# Patient Record
Sex: FEMALE | Race: WHITE | Employment: FULL TIME | URBAN - METROPOLITAN AREA
[De-identification: names, ages, dates, MRNs, and addresses within clinical notes are randomized per-mention and may not be internally consistent; named-entity substitution may affect disease eponyms.]

---

## 2017-02-07 ENCOUNTER — MYC MEDICAL ADVICE (OUTPATIENT)
Dept: INTERNAL MEDICINE | Facility: CLINIC | Age: 52
End: 2017-02-07

## 2017-02-07 DIAGNOSIS — R00.2 PALPITATIONS: Primary | ICD-10-CM

## 2017-02-08 NOTE — TELEPHONE ENCOUNTER
See my chart message  See my response.    Pt is wondering about a heart even monitor.  Please advise  Harshal MANJARREZ RN

## 2017-02-21 ENCOUNTER — MYC MEDICAL ADVICE (OUTPATIENT)
Dept: INTERNAL MEDICINE | Facility: CLINIC | Age: 52
End: 2017-02-21

## 2017-02-21 ENCOUNTER — HOSPITAL ENCOUNTER (OUTPATIENT)
Dept: CARDIOLOGY | Facility: CLINIC | Age: 52
Discharge: HOME | End: 2017-02-21
Attending: INTERNAL MEDICINE | Admitting: INTERNAL MEDICINE
Payer: COMMERCIAL

## 2017-02-21 DIAGNOSIS — R00.2 PALPITATIONS: ICD-10-CM

## 2017-02-21 PROCEDURE — 93272 ECG/REVIEW INTERPRET ONLY: CPT | Performed by: INTERNAL MEDICINE

## 2017-02-21 PROCEDURE — 93270 REMOTE 30 DAY ECG REV/REPORT: CPT

## 2017-03-02 ENCOUNTER — TELEPHONE (OUTPATIENT)
Dept: INTERNAL MEDICINE | Facility: CLINIC | Age: 52
End: 2017-03-02

## 2017-03-02 DIAGNOSIS — I47.10 PAROXYSMAL SUPRAVENTRICULAR TACHYCARDIA (H): Primary | ICD-10-CM

## 2017-03-02 NOTE — TELEPHONE ENCOUNTER
Sounds like she needs to see Cardiology. I don't think she has an outside Cardiologist so referral placed. Please call her and let her know.

## 2017-03-02 NOTE — TELEPHONE ENCOUNTER
Doug from [employer] (heart monitor company) calling to report:    Abnormal ekg reading last night @ 7:41pm:  Super ventricular tach--highest heart rate was 192.    Will fax over report.

## 2017-03-28 ENCOUNTER — CARE COORDINATION (OUTPATIENT)
Dept: CARDIOLOGY | Facility: CLINIC | Age: 52
End: 2017-03-28

## 2017-03-28 NOTE — PROGRESS NOTES
Call to pts cell number; left vm    We can move pts appt to 4/4 at 1:00 pm with Dr Boyd or   4/10 at 1 or 2 (if still open) scheduled. Asked to call back.     Routed msg to pt via my chart.

## 2017-04-02 ASSESSMENT — ENCOUNTER SYMPTOMS
BACK PAIN: 0
NECK PAIN: 0
STIFFNESS: 1
JOINT SWELLING: 0
EXERCISE INTOLERANCE: 0
MYALGIAS: 0
SLEEP DISTURBANCES DUE TO BREATHING: 0
CLAUDICATION: 0
LEG SWELLING: 0
ARTHRALGIAS: 1
SYNCOPE: 0
MUSCLE WEAKNESS: 0
HYPOTENSION: 0
ORTHOPNEA: 0
PALPITATIONS: 1
HYPERTENSION: 0
TACHYCARDIA: 1
LIGHT-HEADEDNESS: 0
MUSCLE CRAMPS: 0
LEG PAIN: 1

## 2017-04-05 ENCOUNTER — TRANSFERRED RECORDS (OUTPATIENT)
Dept: HEALTH INFORMATION MANAGEMENT | Facility: CLINIC | Age: 52
End: 2017-04-05

## 2017-04-10 ENCOUNTER — OFFICE VISIT (OUTPATIENT)
Dept: CARDIOLOGY | Facility: CLINIC | Age: 52
End: 2017-04-10
Attending: INTERNAL MEDICINE
Payer: COMMERCIAL

## 2017-04-10 ENCOUNTER — PRE VISIT (OUTPATIENT)
Dept: CARDIOLOGY | Facility: CLINIC | Age: 52
End: 2017-04-10

## 2017-04-10 VITALS
HEIGHT: 68 IN | OXYGEN SATURATION: 100 % | WEIGHT: 180 LBS | HEART RATE: 65 BPM | DIASTOLIC BLOOD PRESSURE: 88 MMHG | SYSTOLIC BLOOD PRESSURE: 143 MMHG | BODY MASS INDEX: 27.28 KG/M2

## 2017-04-10 DIAGNOSIS — I47.10 PAROXYSMAL SUPRAVENTRICULAR TACHYCARDIA (H): Primary | ICD-10-CM

## 2017-04-10 DIAGNOSIS — I47.10 PAROXYSMAL SUPRAVENTRICULAR TACHYCARDIA (H): ICD-10-CM

## 2017-04-10 PROCEDURE — 99204 OFFICE O/P NEW MOD 45 MIN: CPT | Mod: GC | Performed by: INTERNAL MEDICINE

## 2017-04-10 PROCEDURE — 99212 OFFICE O/P EST SF 10 MIN: CPT | Mod: ZF

## 2017-04-10 PROCEDURE — 93010 ELECTROCARDIOGRAM REPORT: CPT | Mod: ZP | Performed by: INTERNAL MEDICINE

## 2017-04-10 PROCEDURE — 93005 ELECTROCARDIOGRAM TRACING: CPT | Mod: ZF

## 2017-04-10 RX ORDER — VERAPAMIL HYDROCHLORIDE 80 MG/1
80 TABLET ORAL 3 TIMES DAILY PRN
Qty: 5 TABLET | Refills: 3 | Status: SHIPPED | OUTPATIENT
Start: 2017-04-10 | End: 2023-01-31

## 2017-04-10 ASSESSMENT — PAIN SCALES - GENERAL: PAINLEVEL: NO PAIN (0)

## 2017-04-10 NOTE — PATIENT INSTRUCTIONS
Cardiology Provider you saw in clinic today:  Dr. Aryan Boyd MD    Medication Changes:  Verapamil as instructed by Dr Boyd.     Order is in place, you can call and scheduled echocardiogram at [facility].   [address] [facility] [place] [address]   [phone]       Labs/Tests needed:     Follow-up Visit:  prn    Further Instructions:      You will receive all normal lab and testing results via MyChart or mail if not reviewed in clinic today. Please contact our office if you need assistance with setting up MyChart.    If you need a medication refill please contact your pharmacy. Please allow 3 business days for your refill to be completed.     As always, thank you for trusting us with your health care needs!    If you have any questions regarding your visit please contact your care team:   Cardiology  Telephone Number    EP RN  Electrophysiology Nurse Coordinator [phone]     Call for EP procedure scheduling concerns  Lorena Wilkins,   ELIO  [phone]           Device Clinic (Pacemakers, ICDs, Loop)   RN's : Diana Hall Connie, Dawn During business hours: [phone]    After business hours:   [phone]- select option 4 and ask for job code [id number].

## 2017-04-10 NOTE — TELEPHONE ENCOUNTER
Clinical Cardiac Electrophysiology    Chief Complaint:  paroxysmal SVT     HPI: Marisela Beaver is a 51 year old female who presents for follow up of SVT.   Her past medical history includes hyperlipidemia, PTSD, benign neoplasm of the cerebral meninges, thyrotoxicosis, latrogenic PE and infarction, embolism, thrombosis.      Per Dr. Cabral's 12/26/2016 note, she states she is having daily symptoms of paroxysmal SVT symptoms, cardionet was placed.      Current Outpatient Prescriptions   Medication Sig Dispense Refill     carBAMazepine (CARBATROL) 100 MG 12 hr capsule Take 3 caps (300mg) by mouth in AM and 4 caps (400mg) in  capsule 11     IBUPROFEN PO Take  by mouth as needed.         Past Medical History:   Diagnosis Date     Benign neoplasm of cerebral meninges (H)      Embolism and thrombosis of unspecified site 9/07    complication from meningioma removal     Iatrogenic pulmonary embolism and infarction (H) 9/07    complication from meningioma removal     Paroxysmal supraventricular tachycardia (H)      Thyrotoxicosis without mention of goiter or other cause, without mention of thyrotoxic crisis or storm     txd .w propylthyouarcil twice currently in remission       Past Surgical History:   Procedure Laterality Date     C EXCIS SUPRATENT MENINGIOMA  9/07    right frontal 8cm       Family History   Problem Relation Age of Onset     DIABETES Father      Hypertension Mother      HEART DISEASE Father      CANCER Mother      breast cancer     Thyroid Disease Father      graves disease       Social History   Substance Use Topics     Smoking status: Never Smoker     Smokeless tobacco: Never Used     Alcohol use 0.0 oz/week     0 Standard drinks or equivalent per week      Comment: occ       No Known Allergies      ROS:   CONSTITUTIONAL:No report of fever, chills,  or change in weight  RESPIRATORY: No cough, wheezing, SOB, or hemoptysis  CARDIOVASCULAR: see HPI  MUSCULO-SKELETAL: No joint pain/swelling, no  muslce pain  NEURO: No paresthesias, syncope, pre-syncope, light headness, dizziness or vertigo  ENDOCRINE: No temperature intolerance, no skin/hair changes  PSYCHIATRIC: No change in mood, feeling down/anxious, no change in sleep or appetite  GI: no melena or hematochezia, no change in bowel habits  : no hematuria or dysurea, no hesitancy, dribbling or incontinence  HEME: no easy bruising or bleeding, no history of anemia, no history of blood clots  SKIN: no rashes or sores, no unusual itching        Physical Examination:  Vitals: There were no vitals taken for this visit.  BMI= There is no height or weight on file to calculate BMI.    GENERAL APPEARANCE: healthy, alert and no distress  HEENT: no icterus, no xanthelasmas, normal pupil size and reaction, normal palate, mucosa moist, no cyanosis.  NECK: no adenopathy, no asymmetry, masses, or scars, thyroid normal to palpation, carotid upstrokes are normal bilaterally, no cervical bruits, JVP is not visible  CHEST: lungs clear to auscultation - no rales, rhonchi or wheezes, no use of accessory muscles, no retractions, respirations are unlabored, normal respiratory rate, no kyphosis, no scoliosis  CARDIOVASCULAR: regular rhythm, normal S1 with physiologic split S2, no S3 or S4 and no murmur, click or rub, precordium quiet with normal PMI.  ABDOMEN: soft, non tender, without hepatosplenomegaly, no masses palpable, bowel sounds normal, aorta not enlarged by palpation, no abdominal bruits  EXTREMITIES: no clubbing, cyanosis or edema  NEURO: alert and oriented to person/place/time, normal speech, gait and affect  VASC: Radial, femoral, dorsalis pedis and posterior tibialis pulses are normal in volumes and symmetric bilaterally. No vascular bruits heard.  SKIN: no ecchymoses, no rashes      Laboratory:    Component      Latest Ref Rng & Units 12/26/2016   Sodium      133 - 144 mmol/L 142   Potassium      3.4 - 5.3 mmol/L 4.2   Chloride      94 - 109 mmol/L 107   Carbon  Dioxide      20 - 32 mmol/L 29   Anion Gap      3 - 14 mmol/L 6   Glucose      70 - 99 mg/dL 109 (H)   Urea Nitrogen      7 - 30 mg/dL 13   Creatinine      0.52 - 1.04 mg/dL 0.84   GFR Estimate      >60 mL/min/1.7m2 71   GFR Estimate If Black      >60 mL/min/1.7m2 86   Calcium      8.5 - 10.1 mg/dL 8.7   Bilirubin Total      0.2 - 1.3 mg/dL 0.3   Albumin      3.4 - 5.0 g/dL 3.8   Protein Total      6.8 - 8.8 g/dL 7.5   Alkaline Phosphatase      40 - 150 U/L 95   ALT      0 - 50 U/L 29   AST      0 - 45 U/L 16   WBC      4.0 - 11.0 10e9/L 4.9   RBC Count      3.8 - 5.2 10e12/L 4.19   Hemoglobin      11.7 - 15.7 g/dL 13.8   Hematocrit      35.0 - 47.0 % 40.9   MCV      78 - 100 fl 98   MCH      26.5 - 33.0 pg 32.9   MCHC      31.5 - 36.5 g/dL 33.7   RDW      10.0 - 15.0 % 12.1   Platelet Count      150 - 450 10e9/L 173   TSH      0.40 - 4.00 mU/L 1.36       CARDIONET EVENT MONITOR -   2/21-3/21/2017    Assessment and recommendations:    I appreciate the chance to help with Ms. Sd barrientos. Please let me know if you have any questions or concerns.

## 2017-04-10 NOTE — PROGRESS NOTES
"      Clinical Cardiac Electrophysiology    Chief Complaint:  paroxysmal SVT     HPI: Marisela Beaver is a 51 year old female who presents for follow up of SVT.   Her past medical history includes hyperlipidemia, PTSD, benign neoplasm of the cerebral meninges, thyrotoxicosis (currently in remission), latrogenic PE and infarction, embolism, thrombosis.      Previously evaluated by Dr. Geremias Miller at [facility] on August 31, 2001 for palpitations, discussed options including ablation and beta blockers which is not a fan off due to slow metabolism. Since then, palpitations gradually becoming more frequent and slightly more bothersome. She mentioned to her primary care provider in Dec 2016 about the increasing frequency, and 30-day Cardionet was requested. During that monitor she had 2 \"bad\" episodes which last >1 hr with a heart rate >190 bpm. She feels slightly lightheaded and uncomfortable during the episodes, but no shortness of breath, chest pain, syncope or presyncope. She can generally continue whatever she is doing during the palpitations. Has never had to seek evaluation in the ED for this. Tries vagal maneuvers with deep breathing or coughing, but they don't typically work. Has not tried cold water. She worries that the episodes of palpitations might cause damage to her heart or lead to a heart attack.     Of note, she has two adult children (twins, age 18). No cardiac complications during pregnancy. Has history of benign brain tumor s/p resection, with provoked DVT/PE thereafter. IVC filter for 30 days, now removed. No DVT/PE since then. Was briefly on BBlocker medication around the time of the brain surgery, but didn't like the way it made her feel, and worries about the possible side effect of \"slowed metabolism\". She is generally a very active person and would prefer to avoid medications. She things that's that extremely stressful situations may sometimes be a trigger for SVT.       Current Outpatient " Prescriptions   Medication Sig Dispense Refill     carBAMazepine (CARBATROL) 100 MG 12 hr capsule Take 3 caps (300mg) by mouth in AM and 4 caps (400mg) in  capsule 11     IBUPROFEN PO Take  by mouth as needed.         Past Medical History:   Diagnosis Date     Benign neoplasm of cerebral meninges (H)      Embolism and thrombosis of unspecified site 9/07    complication from meningioma removal     Iatrogenic pulmonary embolism and infarction (H) 9/07    complication from meningioma removal     Paroxysmal supraventricular tachycardia (H)      Thyrotoxicosis without mention of goiter or other cause, without mention of thyrotoxic crisis or storm     txd .w propylthyouarcil twice currently in remission       Past Surgical History:   Procedure Laterality Date     C EXCIS SUPRATENT MENINGIOMA  9/07    right frontal 8cm       Family History   Problem Relation Age of Onset     DIABETES Father      Hypertension Mother      HEART DISEASE Father      CANCER Mother      breast cancer     Thyroid Disease Father      graves disease       Social History   Substance Use Topics     Smoking status: Never Smoker     Smokeless tobacco: Never Used     Alcohol use 0.0 oz/week     0 Standard drinks or equivalent per week      Comment: occ   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 Lifelong nonsmoker. Rare ETOH use. No illicit drug use.     Allergies: No Known Allergies    ROS:   CONSTITUTIONAL:No report of fever, chills,  or change in weight  RESPIRATORY: No cough, wheezing, SOB, or hemoptysis  CARDIOVASCULAR: see HPI  MUSCULO-SKELETAL: No joint pain/swelling, no muscle pain  NEURO: No paresthesias, syncope, pre-syncope, light headness, dizziness or vertigo  ENDOCRINE: No temperature intolerance, no skin/hair changes  PSYCHIATRIC: No change in mood, feeling down/anxious, no change in sleep or appetite  GI: no melena or hematochezia, no change in bowel habits  : no hematuria or dysurea, no hesitancy, dribbling or  "incontinence  HEME: no easy bruising or bleeding, no history of anemia, no history of blood clots  SKIN: no rashes or sores, no unusual itching    Answers for HPI/ROS submitted by the patient on 4/2/2017   General Symptoms: No  Skin Symptoms: No  HENT Symptoms: No  EYE SYMPTOMS: No  HEART SYMPTOMS: Yes  LUNG SYMPTOMS: No  INTESTINAL SYMPTOMS: No  URINARY SYMPTOMS: No  GYNECOLOGIC SYMPTOMS: No  BREAST SYMPTOMS: No  SKELETAL SYMPTOMS: Yes  BLOOD SYMPTOMS: No  NERVOUS SYSTEM SYMPTOMS: No  MENTAL HEALTH SYMPTOMS: No  Chest pain or pressure: No  Fast or irregular heartbeat: Yes  Pain in legs with walking: Yes  Swelling in feet or ankles: No  Trouble breathing while lying down: No  Fingers or Toes appear blue: No  High blood pressure: No  Low blood pressure: No  Fainting: No  Murmurs: No  Chest pain on exertion: No  Chest pain at rest: No  Cramping pain in leg during exercise: No  Pacemaker: No  Varicose veins: No  Edema or swelling: No  Fast heart beat: Yes  Wake up at night with shortness of breath: No  Heart flutters: Yes  Light-headedness: No  Exercise intolerance: No  Back pain: No  Muscle aches: No  Neck pain: No  Swollen joints: No  Joint pain: Yes  Bone pain: No  Muscle cramps: No  Muscle weakness: No  Joint stiffness: Yes  Bone fracture: No      Physical Examination:  Vitals: /88  Pulse 65  Ht 1.727 m (5' 8\")  Wt 81.6 kg (180 lb)  SpO2 100%  BMI 27.37 kg/m2  BMI= Body mass index is 27.37 kg/(m^2).  GENERAL APPEARANCE: healthy, alert and no distress  HEENT: no icterus, no xanthelasmas, normal pupil size and reaction, normal palate, mucosa moist, no cyanosis.  NECK: no adenopathy, no asymmetry, masses, or scars, thyroid normal to palpation, carotid upstrokes are normal bilaterally, no cervical bruits, JVP is not visible  CHEST: lungs clear to auscultation - no rales, rhonchi or wheezes, no use of accessory muscles, no retractions, respirations are unlabored, normal respiratory rate, no kyphosis, no " scoliosis  CARDIOVASCULAR: regular rhythm, normal S1 with physiologic split S2, no S3 or S4 and no murmur, click or rub, precordium quiet with normal PMI.  ABDOMEN: soft, non tender, no masses palpable, bowel sounds normal, no abdominal bruits  EXTREMITIES: no clubbing, cyanosis or edema  NEURO: alert and oriented to person/place/time, normal speech, gait and affect  VASC: Radial and posterior tibialis pulses are normal in volumes and symmetric bilaterally. No vascular bruits heard.  SKIN: no ecchymoses, no rashes      Laboratory:    Component      Latest Ref Rng & Units 12/26/2016   Sodium      133 - 144 mmol/L 142   Potassium      3.4 - 5.3 mmol/L 4.2   Chloride      94 - 109 mmol/L 107   Carbon Dioxide      20 - 32 mmol/L 29   Anion Gap      3 - 14 mmol/L 6   Glucose      70 - 99 mg/dL 109 (H)   Urea Nitrogen      7 - 30 mg/dL 13   Creatinine      0.52 - 1.04 mg/dL 0.84   GFR Estimate      >60 mL/min/1.7m2 71   GFR Estimate If Black      >60 mL/min/1.7m2 86   Calcium      8.5 - 10.1 mg/dL 8.7   Bilirubin Total      0.2 - 1.3 mg/dL 0.3   Albumin      3.4 - 5.0 g/dL 3.8   Protein Total      6.8 - 8.8 g/dL 7.5   Alkaline Phosphatase      40 - 150 U/L 95   ALT      0 - 50 U/L 29   AST      0 - 45 U/L 16   WBC      4.0 - 11.0 10e9/L 4.9   RBC Count      3.8 - 5.2 10e12/L 4.19   Hemoglobin      11.7 - 15.7 g/dL 13.8   Hematocrit      35.0 - 47.0 % 40.9   MCV      78 - 100 fl 98   MCH      26.5 - 33.0 pg 32.9   MCHC      31.5 - 36.5 g/dL 33.7   RDW      10.0 - 15.0 % 12.1   Platelet Count      150 - 450 10e9/L 173   TSH      0.40 - 4.00 mU/L 1.36       CARDIONET EVENT MONITOR - 2/21-3/21/2017:      EKG 4/10/17:  Sinus rhythm, rate 57, normal axis, normal intervals, RSr' pattern in V1-V2 with corresponding T-wave inversion, no ischemic changes or evidence of prior infarction. No delta wave. During a subsequent rhythm strip, there is evidence of sinus slowing with accelerated junctional rhythm.      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 Beaver is only modestly symptomatic, but with increasing frequency and duration of episodes she may consider a treatment option in the future. No associated stroke risk with SVT.   We educated about additional vagal maneuvers which could be helpful to break an episode of SVT.  Options for treatment including medications (less successful) and EPS +/- ablation. The procedural risk of EP study and ablation were discussed in detail. Risks discussed include: vascular complications, CVA, AVB, pericardial effusion and tamponade, esophageal fistula, PV stenosis. She might be at potentially slightly higher risk for DVT/PE (could consider short-term plan for anticoagulation ria-operatively). She would like to think about it some more.     Medications are not perfectly treat this arhythmia but are not 100% effective.    - Beta blockers (she would prefer to avoid)  - Calcium channel blockers (i.e. Verapamil PRN, she is willing to try this).     Recs:  --Will Rx Verapamil 80mg short-acting tab PRN for prolonged episode of tachycardia  --Instructed to lie down and rest after taking Verapamil due to risk of blood pressure lowering  --She will consider EP study +/- ablation and let us know if she'd like to proceed  --RTC as needed    I appreciate the chance to help with Ms. Beaver care. Please let me know if you have any questions or concerns.    Patient seen and discussed with Dr. Boyd in clinic.    Cha Schuster MD  Cardiology Fellow  [phone]    Attending: Patient seen and examined with Dr. Schuster (Cardiology Fellow). The H&P is accurate as recorded and includes any additional findings of mine. All labs and imaging studies reviewed personally. The assessment and plans outlined reflect our joint decision making.    Aryan Boyd MD

## 2017-04-10 NOTE — MR AVS SNAPSHOT
After Visit Summary   4/10/2017    Marisela Beaver    MRN: [id number]           Patient Information     Date Of Birth          1965        Visit Information        Provider Department      4/10/2017 1:00 PM Aryan Boyd MD [facility]        Today's Diagnoses     Paroxysmal supraventricular tachycardia (H)          Care Instructions    Cardiology Provider you saw in clinic today:  Dr. Aryan Boyd MD    Medication Changes:  Verapamil as instructed by Dr Boyd.     Order is in place, you can call and scheduled echocardiogram at [facility].   [address] [facility] [place] [address]   [phone]       Labs/Tests needed:     Follow-up Visit:  prn    Further Instructions:      You will receive all normal lab and testing results via [employer] or mail if not reviewed in clinic today. Please contact our office if you need assistance with setting up MyChart.    If you need a medication refill please contact your pharmacy. Please allow 3 business days for your refill to be completed.     As always, thank you for trusting us with your health care needs!    If you have any questions regarding your visit please contact your care team:   Cardiology  Telephone Number    EP RN  Electrophysiology Nurse Coordinator [phone]     Call for EP procedure scheduling concerns  ELIO Adam  [phone]           Device Clinic (Pacemakers, ICDs, Loop)   RN's : Diana Hall Connie, Dawn During business hours: [phone]    After business hours:   [phone]- select option 4 and ask for job code [id number].              Follow-ups after your visit        Follow-up notes from your care team     Return for SVT follow up.      Your next 10 appointments already scheduled     Apr 10, 2017  3:00 PM CDT   Ech Complete with [id number]   [facility] Echo ([facility])    [address]  [address] [facility] [place] [address] [phone]           1.  Please  "bring or wear a comfortable two-piece outfit. 2.  You may eat, drink and take your normal medicines. 3.  For any questions that cannot be answered, please contact the ordering physician              Future tests that were ordered for you today     Open Future Orders        Priority Expected Expires Ordered    Echocardiogram Complete Routine  4/10/2018 4/10/2017            Who to contact     If you have questions or need follow up information about today's clinic visit or your schedule please contact [facility] directly at [phone].  Normal or non-critical lab and imaging results will be communicated to you by [employer], letter or phone within 4 business days after the clinic has received the results. If you do not hear from us within 7 days, please contact the clinic through [employer] or phone. If you have a critical or abnormal lab result, we will notify you by phone as soon as possible.  Submit refill requests through [employer] or call your pharmacy and they will forward the refill request to us. Please allow 3 business days for your refill to be completed.          Additional Information About Your Visit        [employer] Information     [employer] gives you secure access to your electronic health record. If you see a primary care provider, you can also send messages to your care team and make appointments. If you have questions, please call your primary care clinic.  If you do not have a primary care provider, please call [phone] and they will assist you.        Care EveryWhere ID     This is your Care EveryWhere ID. This could be used by other organizations to access your [place] medical records  [id number]        Your Vitals Were     Pulse Height Pulse Oximetry BMI (Body Mass Index)          65 1.727 m (5' 8\") 100% 27.37 kg/m2         Blood Pressure from Last 3 Encounters:   04/10/17 143/88   12/26/16 128/82   08/12/16 142/90    Weight from Last 3 Encounters:   04/10/17 81.6 kg (180 lb)   12/26/16 " 83.5 kg (184 lb)   08/12/16 81.6 kg (180 lb)              We Performed the Following     EKG 12-lead, tracing only (Future)          Today's Medication Changes          These changes are accurate as of: 4/10/17  2:16 PM.  If you have any questions, ask your nurse or doctor.               Start taking these medicines.        Dose/Directions    verapamil 80 MG tablet   Commonly known as:  CALAN   Used for:  Paroxysmal supraventricular tachycardia (H)   Started by:  Aryan Boyd MD        Dose:  80 mg   Take 1 tablet (80 mg) by mouth 3 times daily as needed   Quantity:  5 tablet   Refills:  3            Where to get your medicines      These medications were sent to [facility] [facility] [address] [facility] - [place], [place] - [address]  [address] [place] [address]     Phone:  [phone]     verapamil 80 MG tablet                Primary Care Provider Office Phone # Fax #    Madison Cabral [phone]       [facility] [address]   [facility] [address]        Thank you!     Thank you for choosing [facility]  for your care. Our goal is always to provide you with excellent care. Hearing back from our patients is one way we can continue to improve our services. Please take a few minutes to complete the written survey that you may receive in the mail after your visit with us. Thank you!             Your Updated Medication List - Protect others around you: Learn how to safely use, store and throw away your medicines at www.disposemymeds.org.          This list is accurate as of: 4/10/17  2:16 PM.  Always use your most recent med list.                   Brand Name Dispense Instructions for use    carBAMazepine 100 MG 12 hr capsule    CARBATROL    210 capsule    Take 3 caps (300mg) by mouth in AM and 4 caps (400mg) in PM       IBUPROFEN PO      Take  by mouth as needed.       verapamil 80 MG tablet    CALAN    5 tablet    Take 1 tablet (80 mg) by mouth 3 times daily  as needed

## 2017-04-10 NOTE — NURSING NOTE
Chief Complaint   Patient presents with     New Patient     EKG-SVT- palpitations, SVT noted on 30 DEM, ref by PCP- noted SVT in 2007- holter, short run, normal echo 2007

## 2017-04-10 NOTE — LETTER
"4/10/2017      RE: Marisela Beaver  [address] [place] [address]       Dear Colleague,    Thank you for the opportunity to participate in the care of your patient, Marisela Beaver, at the [facility]. Please see a copy of my visit note below.          Clinical Cardiac Electrophysiology    Chief Complaint:  paroxysmal SVT     HPI: Marisela Beaver is a 51 year old female who presents for follow up of SVT.   Her past medical history includes hyperlipidemia, PTSD, benign neoplasm of the cerebral meninges, thyrotoxicosis (currently in remission), latrogenic PE and infarction, embolism, thrombosis.      Previously evaluated by Dr. Geremias Miller at [facility] on August 31, 2001 for palpitations, discussed options including ablation and beta blockers which is not a fan off due to slow metabolism. Since then, palpitations gradually becoming more frequent and slightly more bothersome. She mentioned to her primary care provider in Dec 2016 about the increasing frequency, and 30-day Cardionet was requested. During that monitor she had 2 \"bad\" episodes which last >1 hr with a heart rate >190 bpm. She feels slightly lightheaded and uncomfortable during the episodes, but no shortness of breath, chest pain, syncope or presyncope. She can generally continue whatever she is doing during the palpitations. Has never had to seek evaluation in the ED for this. Tries vagal maneuvers with deep breathing or coughing, but they don't typically work. Has not tried cold water. She worries that the episodes of palpitations might cause damage to her heart or lead to a heart attack.     Of note, she has two adult children (twins, age 18). No cardiac complications during pregnancy. Has history of benign brain tumor s/p resection, with provoked DVT/PE thereafter. IVC filter for 30 days, now removed. No DVT/PE since then. Was briefly on BBlocker medication around the " "time of the brain surgery, but didn't like the way it made her feel, and worries about the possible side effect of \"slowed metabolism\". She is generally a very active person and would prefer to avoid medications. She things that's that extremely stressful situations may sometimes be a trigger for SVT.       Current Outpatient Prescriptions   Medication Sig Dispense Refill     carBAMazepine (CARBATROL) 100 MG 12 hr capsule Take 3 caps (300mg) by mouth in AM and 4 caps (400mg) in  capsule 11     IBUPROFEN PO Take  by mouth as needed.         Past Medical History:   Diagnosis Date     Benign neoplasm of cerebral meninges (H)      Embolism and thrombosis of unspecified site 9/07    complication from meningioma removal     Iatrogenic pulmonary embolism and infarction (H) 9/07    complication from meningioma removal     Paroxysmal supraventricular tachycardia (H)      Thyrotoxicosis without mention of goiter or other cause, without mention of thyrotoxic crisis or storm     txd .w propylthyouarcil twice currently in remission       Past Surgical History:   Procedure Laterality Date     C EXCIS SUPRATENT MENINGIOMA  9/07    right frontal 8cm       Family History   Problem Relation Age of Onset     DIABETES Father      Hypertension Mother      HEART DISEASE Father      CANCER Mother      breast cancer     Thyroid Disease Father      graves disease       Social History   Substance Use Topics     Smoking status: Never Smoker     Smokeless tobacco: Never Used     Alcohol use 0.0 oz/week     0 Standard drinks or equivalent per week      Comment: occ   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 Lifelong nonsmoker. Rare ETOH use. No illicit drug use.     Allergies: No Known Allergies    ROS:   CONSTITUTIONAL:No report of fever, chills,  or change in weight  RESPIRATORY: No cough, wheezing, SOB, or hemoptysis  CARDIOVASCULAR: see HPI  MUSCULO-SKELETAL: No joint pain/swelling, no muscle pain  NEURO: No paresthesias, " "syncope, pre-syncope, light headness, dizziness or vertigo  ENDOCRINE: No temperature intolerance, no skin/hair changes  PSYCHIATRIC: No change in mood, feeling down/anxious, no change in sleep or appetite  GI: no melena or hematochezia, no change in bowel habits  : no hematuria or dysurea, no hesitancy, dribbling or incontinence  HEME: no easy bruising or bleeding, no history of anemia, no history of blood clots  SKIN: no rashes or sores, no unusual itching    Answers for HPI/ROS submitted by the patient on 4/2/2017   General Symptoms: No  Skin Symptoms: No  HENT Symptoms: No  EYE SYMPTOMS: No  HEART SYMPTOMS: Yes  LUNG SYMPTOMS: No  INTESTINAL SYMPTOMS: No  URINARY SYMPTOMS: No  GYNECOLOGIC SYMPTOMS: No  BREAST SYMPTOMS: No  SKELETAL SYMPTOMS: Yes  BLOOD SYMPTOMS: No  NERVOUS SYSTEM SYMPTOMS: No  MENTAL HEALTH SYMPTOMS: No  Chest pain or pressure: No  Fast or irregular heartbeat: Yes  Pain in legs with walking: Yes  Swelling in feet or ankles: No  Trouble breathing while lying down: No  Fingers or Toes appear blue: No  High blood pressure: No  Low blood pressure: No  Fainting: No  Murmurs: No  Chest pain on exertion: No  Chest pain at rest: No  Cramping pain in leg during exercise: No  Pacemaker: No  Varicose veins: No  Edema or swelling: No  Fast heart beat: Yes  Wake up at night with shortness of breath: No  Heart flutters: Yes  Light-headedness: No  Exercise intolerance: No  Back pain: No  Muscle aches: No  Neck pain: No  Swollen joints: No  Joint pain: Yes  Bone pain: No  Muscle cramps: No  Muscle weakness: No  Joint stiffness: Yes  Bone fracture: No      Physical Examination:  Vitals: /88  Pulse 65  Ht 1.727 m (5' 8\")  Wt 81.6 kg (180 lb)  SpO2 100%  BMI 27.37 kg/m2  BMI= Body mass index is 27.37 kg/(m^2).  GENERAL APPEARANCE: healthy, alert and no distress  HEENT: no icterus, no xanthelasmas, normal pupil size and reaction, normal palate, mucosa moist, no cyanosis.  NECK: no adenopathy, no " asymmetry, masses, or scars, thyroid normal to palpation, carotid upstrokes are normal bilaterally, no cervical bruits, JVP is not visible  CHEST: lungs clear to auscultation - no rales, rhonchi or wheezes, no use of accessory muscles, no retractions, respirations are unlabored, normal respiratory rate, no kyphosis, no scoliosis  CARDIOVASCULAR: regular rhythm, normal S1 with physiologic split S2, no S3 or S4 and no murmur, click or rub, precordium quiet with normal PMI.  ABDOMEN: soft, non tender, no masses palpable, bowel sounds normal, no abdominal bruits  EXTREMITIES: no clubbing, cyanosis or edema  NEURO: alert and oriented to person/place/time, normal speech, gait and affect  VASC: Radial and posterior tibialis pulses are normal in volumes and symmetric bilaterally. No vascular bruits heard.  SKIN: no ecchymoses, no rashes      Laboratory:    Component      Latest Ref Rng & Units 12/26/2016   Sodium      133 - 144 mmol/L 142   Potassium      3.4 - 5.3 mmol/L 4.2   Chloride      94 - 109 mmol/L 107   Carbon Dioxide      20 - 32 mmol/L 29   Anion Gap      3 - 14 mmol/L 6   Glucose      70 - 99 mg/dL 109 (H)   Urea Nitrogen      7 - 30 mg/dL 13   Creatinine      0.52 - 1.04 mg/dL 0.84   GFR Estimate      >60 mL/min/1.7m2 71   GFR Estimate If Black      >60 mL/min/1.7m2 86   Calcium      8.5 - 10.1 mg/dL 8.7   Bilirubin Total      0.2 - 1.3 mg/dL 0.3   Albumin      3.4 - 5.0 g/dL 3.8   Protein Total      6.8 - 8.8 g/dL 7.5   Alkaline Phosphatase      40 - 150 U/L 95   ALT      0 - 50 U/L 29   AST      0 - 45 U/L 16   WBC      4.0 - 11.0 10e9/L 4.9   RBC Count      3.8 - 5.2 10e12/L 4.19   Hemoglobin      11.7 - 15.7 g/dL 13.8   Hematocrit      35.0 - 47.0 % 40.9   MCV      78 - 100 fl 98   MCH      26.5 - 33.0 pg 32.9   MCHC      31.5 - 36.5 g/dL 33.7   RDW      10.0 - 15.0 % 12.1   Platelet Count      150 - 450 10e9/L 173   TSH      0.40 - 4.00 mU/L 1.36       CARDIONET EVENT MONITOR - 2/21-3/21/2017:      EKG  4/10/17:  Sinus rhythm, rate 57, normal axis, normal intervals, RSr' pattern in V1-V2 with corresponding T-wave inversion, no ischemic changes or evidence of prior infarction. No delta wave. During a subsequent rhythm strip, there is evidence of sinus slowing with accelerated junctional rhythm.     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 Beaver is only modestly symptomatic, but with increasing frequency and duration of episodes she may consider a treatment option in the future. No associated stroke risk with SVT.   We educated about additional vagal maneuvers which could be helpful to break an episode of SVT.  Options for treatment including medications (less successful) and EPS +/- ablation. The procedural risk of EP study and ablation were discussed in detail. Risks discussed include: vascular complications, CVA, AVB, pericardial effusion and tamponade, esophageal fistula, PV stenosis. She might be at potentially slightly higher risk for DVT/PE (could consider short-term plan for anticoagulation ria-operatively). She would like to think about it some more.     Medications are not perfectly treat this arhythmia but are not 100% effective.    - Beta blockers (she would prefer to avoid)  - Calcium channel blockers (i.e. Verapamil PRN, she is willing to try this).     Recs:  --Will Rx Verapamil 80mg short-acting tab PRN for prolonged episode of tachycardia  --Instructed to lie down and rest after taking Verapamil due to risk of blood pressure lowering  --She will consider EP study +/- ablation and let us know if she'd like to proceed  --RTC as needed    I appreciate the chance to help with Ms. Beaver care. Please let me know if you have any questions or concerns.    Patient seen and discussed with Dr. Boyd in clinic.    Cha Schuster MD  Cardiology  Fellow  [phone]    Attending: Patient seen and examined with Dr. Schuster (Cardiology Fellow). The H&P is accurate as recorded and includes any additional findings of mine. All labs and imaging studies reviewed personally. The assessment and plans outlined reflect our joint decision making.    Aryan Boyd MD

## 2017-04-11 LAB — INTERPRETATION ECG - MUSE: NORMAL

## 2017-04-19 ENCOUNTER — TRANSFERRED RECORDS (OUTPATIENT)
Dept: HEALTH INFORMATION MANAGEMENT | Facility: CLINIC | Age: 52
End: 2017-04-19

## 2017-04-26 ENCOUNTER — MYC MEDICAL ADVICE (OUTPATIENT)
Dept: INTERNAL MEDICINE | Facility: CLINIC | Age: 52
End: 2017-04-26

## 2017-06-24 ENCOUNTER — HEALTH MAINTENANCE LETTER (OUTPATIENT)
Age: 52
End: 2017-06-24

## 2017-07-26 ENCOUNTER — OFFICE VISIT (OUTPATIENT)
Dept: PODIATRY | Facility: CLINIC | Age: 52
End: 2017-07-26
Payer: COMMERCIAL

## 2017-07-26 VITALS — BODY MASS INDEX: 26.98 KG/M2 | HEIGHT: 68 IN | WEIGHT: 178 LBS | HEART RATE: 74 BPM

## 2017-07-26 DIAGNOSIS — M21.41 FLAT FEET: ICD-10-CM

## 2017-07-26 DIAGNOSIS — M79.672 PAIN OF LEFT HEEL: Primary | ICD-10-CM

## 2017-07-26 DIAGNOSIS — M20.12 HALLUX ABDUCTO VALGUS, LEFT: ICD-10-CM

## 2017-07-26 DIAGNOSIS — M72.2 PLANTAR FASCIAL FIBROMATOSIS: ICD-10-CM

## 2017-07-26 DIAGNOSIS — M21.42 FLAT FEET: ICD-10-CM

## 2017-07-26 PROCEDURE — 99203 OFFICE O/P NEW LOW 30 MIN: CPT | Performed by: PODIATRIST

## 2017-07-26 NOTE — PROGRESS NOTES
ASSESSMENT/PLAN:    Encounter Diagnoses   Name Primary?     Pain of left heel Yes     Plantar fasciosis, left      Flat feet      Hallux abducto valgus, left      Pt educated about the cause and nature of heel pain.  Treatment plan discussed includes icing, calf and plantar fascial stretching, avoidance of barefoot walking, wearing sturdy, supportive athletic-type shoes, and activity modification.  Educational handouts provided.    She also asked about her bunion and if it was related to the heel pain.  I told her that a common denominator might be her flat foot.      I discussed the underlying cause of bunions and conservative recommendations.     Body mass index is 27.06 kg/(m^2).    Weight management plan: Patient was referred to their PCP to discuss a diet and exercise plan.      Rashawn An DPM, FACFAS, MS    [place] Department of Podiatry/Foot & Ankle Surgery      ____________________________________________________________________    HPI:         Chief Complaint: left heel pain  Onset of problem: 5 days  Pain/ discomfort is described as:  Deep ache  Ratin/10   Frequency:  daily    The pain is made worse with walking  Previous treatment: ice, stretching    *  Past Medical History:   Diagnosis Date     Benign neoplasm of cerebral meninges (H)      Embolism and thrombosis of unspecified site     complication from meningioma removal     Iatrogenic pulmonary embolism and infarction (H)     complication from meningioma removal     Paroxysmal supraventricular tachycardia (H)      Thyrotoxicosis without mention of goiter or other cause, without mention of thyrotoxic crisis or storm     txd .w propylthyouarcil twice currently in remission   *  *  Past Surgical History:   Procedure Laterality Date     C EXCIS SUPRATENT MENINGIOMA      right frontal 8cm   *  *  Current Outpatient Prescriptions   Medication Sig Dispense Refill     verapamil (CALAN) 80 MG tablet Take 1 tablet (80 mg) by mouth 3 times  "daily as needed 5 tablet 3     carBAMazepine (CARBATROL) 100 MG 12 hr capsule Take 3 caps (300mg) by mouth in AM and 4 caps (400mg) in  capsule 11     IBUPROFEN PO Take  by mouth as needed.         ROS:     A 10-point review of systems was performed and is positive for that noted in the HPI and as seen below.  All other areas are negative.     Numbness in feet?  no   Calf pain with walking? no  Recent foot/ankle injury? no  Weight change over past 12 months? 8# loss  Self perception as overweight? yes  Recent flu-like symptoms? no  Joint pain other than feet ? no    Social History: Employment:  ;  Exercise/Physical activity:  Daily walking;  Tobacco use:  no  Social History     Social History     Marital status:      Spouse name: N/A     Number of children: N/A     Years of education: N/A     Occupational History     Not on file.     Social History Main Topics     Smoking status: Never Smoker     Smokeless tobacco: Never Used     Alcohol use 1.8 oz/week     3 Standard drinks or equivalent per week      Comment: occ     Drug use: No     Sexual activity: Yes     Partners: Male     Other Topics Concern     Parent/Sibling W/ Cabg, Mi Or Angioplasty Before 65f 55m? No     Social History Narrative       Family history:  Family History   Problem Relation Age of Onset     DIABETES Father      HEART DISEASE Father      CABG x 2 at 70     Thyroid Disease Father      graves disease     Hypertension Mother      CANCER Mother      breast cancer     Thyroid Disease Brother        Rheumatoid arthritis:  no  Foot Problems: mother -bunions  Diabetes: father      EXAM:    Vitals: Pulse 74  Ht 5' 8\" (1.727 m)  Wt 178 lb (80.7 kg)  BMI 27.06 kg/m2  BMI: Body mass index is 27.06 kg/(m^2).  Height: 5' 8\"    Constitutional/ general:  Pt is in no apparent distress, appears well-nourished.  Cooperative with history and physical exam.     Vascular:  Pedal pulses are palpable bilaterally for both the DP " and PT arteries.  CFT < 3 sec.  No edema.  Pedal hair growth noted.     Neuro:  Alert and oriented x 3. Coordinated gait.  Light touch sensation is intact to the L4, L5, S1 distributions. No obvious deficits.  No evidence of neurological-based weakness, spasticity, or contracture in the lower extremities.     Derm: Normal texture and turgor.  No erythema, ecchymosis, or cyanosis.  No open lesions.     Musculoskeletal:    Lower extremity muscle strength is normal.  Patient is ambulatory without an assistive device or brace .  Decrease in medial longitudinal arch with weight bearing.   Hallux abducto valgus deformity, left foot.  Reducible in the transverse plane with preserved sagittal plane ROM.  No crepitus.      Pain on palpation to the plantar medial and plantar central aspect of the left heel.  No pain with   side-to-side compression of the heel.      Rashawn An DPM, FACFAS, MS    [place] Department of Podiatry/Foot & Ankle Surgery

## 2017-07-26 NOTE — PATIENT INSTRUCTIONS
DR. LYNCH'S CLINIC LOCATIONS     MONDAY / FRIDAY - [facility] WEDNESDAY - [place]   [address] [place] [address] [address] [address]   [phone] / [phone] / [phone]       THURSDAY - [place] SCHEDULE SURGERY: [phone]   [address] APPOINTMENTS: [phone]   [place] [address] AFTER HOURS: [phone]   [phone] / [phone] BILLING QUESTIONS: [phone]      PLANTAR FASCIITIS  Plantar fasciitis is often referred to as heel spurs or heel pain. Plantar fasciitis is a very common problem that affects people of all foot shapes, age, weight and activity level. Pain may be in the arch or on the weight-bearing surface of the heel. The pain may come on without injury or identifiable cause. Pain is generally present when first getting out of bed in the morning or up from a seated break.     CAUSES  The plantar fascia is a dense fibrous band of tissue that stretches across the bottom surface of the foot. The fascia helps support the foot muscles and arch. Plantar fasciitis is thought to be caused by mechanical strain or overload. Frequent walking without shoes or wearing unsupportive shoes is thought to cause structural overload and ultimately inflammation of the plantar fascia. Some people have heel spurs that can be seen on x-ray. The heel spur is actually a minor component of plantar fascitis and is largely ignored.       SELF TREATMENT   The easiest solution is to stop walking around your home without shoes. Plantar fasciitis is largely a shoe problem. Shoes are either not being worn often enough or your current shoes are inadequate for your weight, foot structure or activity level. The majority of shoes on the market today are not sufficient to resist development of plantar fasciitis or to promote healing. Assume that your current shoes are inadequate and will need to be replaced. Even high quality shoes wear out with 6 months  to one year of frequent use. Weight loss is another option. Losing ten pounds in the next two months may be enough to resolve the problem. Ice applied to the area of pain two to three times per day for ten minutes each session can be very helpful. This should continue until the problem resolves. Achilles tendon stretching is essential. Stretch multiple times daily to promote healing and to prevent recurrence in the future.     MEDICAL TREATMENT  Medical treatments often include custom arch supports, cortisone injections, physical therapy, splints to be worn in bed, prescription medications and surgery. The home treatments listed above will be necessary regardless of these advanced medical treatments. Surgery is rarely needed but is very helpful in selected cases.     PROGNOSIS  Plantar fasciitis can last from one day to a lifetime. Some people get intermittent fascitis that is very short-lived. Others suffer daily for years. Excessive body weight, frequent bare foot walking, long hours on the feet, inadequate shoes, predisposing foot structures and excessive activity such as running are all potential issues that lead to chronic and/or recurring plantar fascitis. Having plantar fasciitis means that you are forever prone to this problem and will require modification of some of the above factors. Most people seek treatment within one to four months. Healing usually requires a similar one to four month time frame. Healing time is relative to the amount of effort spent treating the problem.   Plantar fasciitis is highly recurrent. Risk factors often continue, including return to bare foot walking, inadequate shoes, excessive body weight, excessive activities, etc. Your life style and foot structure may predispose you to recurrent plantar fasciitis. A daily prevention regimen can be very helpful. Ongoing use of shoe inserts, careful attention to appropriate shoes, daily Achilles stretching, etc. may prevent recurrence.  Prompt attention at the earliest warning signs of heel pain can resolve the problem in as short as a few days.     EXERCISES  Stair Exercise: Step on the stairs with the ball of your foot and hold your position for at least 15 seconds, then slowly step down with the heels of your foot. You can do this daily and as often as you want.   Picking the Towel: Sit comfortably and then pick the towel up with your toes. You can use any object other than a towel as long as the material can be soft and you can pick it up with your toes.  Rolling the Bottle: Use a small ball or frozen water bottle and then roll it around with your foot.   Flex the Toes: Sit comfortably and then flex your toes by pointing it towards the floor or towards your body. This will relax and flex your foot and exercise your plantar fascia, the calf, and the Achilles tendon. The inability of the foot to stretch often causes the bunching up of the plantar fascia area leading to the pain.  Calf/Achilles Stretching: Lay on you back and raise one foot, then point your toes towards the floor. See photo below:               Hold each stretch for 10 seconds. Stretch 10 times per set, three sets per day. Morning, afternoon and evening. If your heel pain is very severe in the morning, consider doing the first set of stretches before you get out of bed.      OVER THE COUNTER INSERT RECOMMENDATIONS  SuperFeet   Sofsole Fit Spenco   Power Step   Walk-Fit Arch Cradles     Most of these can be found at your local [employer], sporting [employer] stores, or online.  **A good high quality over the counter insert should cost around $40-$50      [facility] SHOES LOCATIONS  [address]  [phone]   [address]  [phone] [place]  [address] [facility]  [phone]   [place] [id number]  [phone]   [place]  [address] [facility]  [phone] [place] [address]  [phone]   [place] [address]  [facility]  [phone]   [place]  [address] [facility] [address]  [phone] [place]  [address] [phone]     HEEL PAIN TIPS    1)  A rigid-soled, athletic shoe like a hiking shoe is recommended.    2)  Avoid barefoot walking at home. It is a good idea to wear a clean athletic shoe inside.    3)  Stretch your calf muscules and plantar fascia frequently. It is a good idea to do this before getting out of bed.    4)  Ice and ibuprofen can help if pain is related to inflammation.    5)  Some good over the counter inserts might help, see the handout in this packet on our recommendations    * If your pain persists, please return to clinic. Future options include a walking boot, physical therapy, night splints, and injections.        Body Mass Index (BMI)  Many things can cause foot and ankle problems. Foot structure, activity level, foot mechanics and injuries are common causes of pain.  One very important issue that often goes unmentioned, is body weight.  Extra weight can cause increased stress on muscles, ligaments, bones and tendons.  Sometimes just a few extra pounds is all it takes to put one over her/his threshold. Without reducing that stress, it can be difficult to alleviate pain. Some people are uncomfortable addressing this issue, but we feel it is important for you to think about it. As Foot &  Ankle specialists, our job is addressing the lower extremity problem and possible causes. Regarding extra body weight, we encourage patients to discuss diet and weight management plans with their primary care doctors. It is this team approach that gives you the best opportunity for pain relief and getting you back on your feet.

## 2017-07-26 NOTE — MR AVS SNAPSHOT
After Visit Summary   7/26/2017    Marisela Beaver    MRN: [id number]           Patient Information     Date Of Birth          1965        Visit Information        Provider Department      7/26/2017 11:45 AM Rashawn An DPM [facility]        Care Instructions    DR. AN'S CLINIC LOCATIONS     MONDAY / FRIDAY - [facility] WEDNESDAY - [place]   [address] [place] [address] [place] [address]   [phone] / [phone] / [phone]       THURSDAY - [place] SCHEDULE SURGERY: [phone]   [address] APPOINTMENTS: [phone]   [place] [address] AFTER HOURS: [phone]   [phone] / [phone] BILLING QUESTIONS: [phone]      PLANTAR FASCIITIS  Plantar fasciitis is often referred to as heel spurs or heel pain. Plantar fasciitis is a very common problem that affects people of all foot shapes, age, weight and activity level. Pain may be in the arch or on the weight-bearing surface of the heel. The pain may come on without injury or identifiable cause. Pain is generally present when first getting out of bed in the morning or up from a seated break.     CAUSES  The plantar fascia is a dense fibrous band of tissue that stretches across the bottom surface of the foot. The fascia helps support the foot muscles and arch. Plantar fasciitis is thought to be caused by mechanical strain or overload. Frequent walking without shoes or wearing unsupportive shoes is thought to cause structural overload and ultimately inflammation of the plantar fascia. Some people have heel spurs that can be seen on x-ray. The heel spur is actually a minor component of plantar fascitis and is largely ignored.       SELF TREATMENT   The easiest solution is to stop walking around your home without shoes. Plantar fasciitis is largely a shoe problem. Shoes are either not being worn often enough or your current shoes are  inadequate for your weight, foot structure or activity level. The majority of shoes on the market today are not sufficient to resist development of plantar fasciitis or to promote healing. Assume that your current shoes are inadequate and will need to be replaced. Even high quality shoes wear out with 6 months to one year of frequent use. Weight loss is another option. Losing ten pounds in the next two months may be enough to resolve the problem. Ice applied to the area of pain two to three times per day for ten minutes each session can be very helpful. This should continue until the problem resolves. Achilles tendon stretching is essential. Stretch multiple times daily to promote healing and to prevent recurrence in the future.     MEDICAL TREATMENT  Medical treatments often include custom arch supports, cortisone injections, physical therapy, splints to be worn in bed, prescription medications and surgery. The home treatments listed above will be necessary regardless of these advanced medical treatments. Surgery is rarely needed but is very helpful in selected cases.     PROGNOSIS  Plantar fasciitis can last from one day to a lifetime. Some people get intermittent fascitis that is very short-lived. Others suffer daily for years. Excessive body weight, frequent bare foot walking, long hours on the feet, inadequate shoes, predisposing foot structures and excessive activity such as running are all potential issues that lead to chronic and/or recurring plantar fascitis. Having plantar fasciitis means that you are forever prone to this problem and will require modification of some of the above factors. Most people seek treatment within one to four months. Healing usually requires a similar one to four month time frame. Healing time is relative to the amount of effort spent treating the problem.   Plantar fasciitis is highly recurrent. Risk factors often continue, including return to bare foot walking, inadequate shoes,  excessive body weight, excessive activities, etc. Your life style and foot structure may predispose you to recurrent plantar fasciitis. A daily prevention regimen can be very helpful. Ongoing use of shoe inserts, careful attention to appropriate shoes, daily Achilles stretching, etc. may prevent recurrence. Prompt attention at the earliest warning signs of heel pain can resolve the problem in as short as a few days.     EXERCISES  Stair Exercise: Step on the stairs with the ball of your foot and hold your position for at least 15 seconds, then slowly step down with the heels of your foot. You can do this daily and as often as you want.   Picking the Towel: Sit comfortably and then pick the towel up with your toes. You can use any object other than a towel as long as the material can be soft and you can pick it up with your toes.  Rolling the Bottle: Use a small ball or frozen water bottle and then roll it around with your foot.   Flex the Toes: Sit comfortably and then flex your toes by pointing it towards the floor or towards your body. This will relax and flex your foot and exercise your plantar fascia, the calf, and the Achilles tendon. The inability of the foot to stretch often causes the bunching up of the plantar fascia area leading to the pain.  Calf/Achilles Stretching: Lay on you back and raise one foot, then point your toes towards the floor. See photo below:               Hold each stretch for 10 seconds. Stretch 10 times per set, three sets per day. Morning, afternoon and evening. If your heel pain is very severe in the morning, consider doing the first set of stretches before you get out of bed.      OVER THE COUNTER INSERT RECOMMENDATIONS  SuperFeet   Sofsole Fit Spenco   Power Step   Walk-Fit Arch Cradles     Most of these can be found at your local [place], sporting [employer], or online.  **A good high quality over the counter insert should cost around $40-$50      [employer]  LOCATIONS  [place]  [address]  [phone]   [place]  [address]  [phone] [place]  [address]  [phone]   [place]  [address]  [phone]   [place]  [address]  [phone] [place]  [address]  [phone]   [place]  [address]  [phone]   [place]  [address] [place] [address]  [phone] [place]  [address]  [phone]     HEEL PAIN TIPS    1)  A rigid-soled, athletic shoe like a hiking shoe is recommended.    2)  Avoid barefoot walking at home. It is a good idea to wear a clean athletic shoe inside.    3)  Stretch your calf muscules and plantar fascia frequently. It is a good idea to do this before getting out of bed.    4)  Ice and ibuprofen can help if pain is related to inflammation.    5)  Some good over the counter inserts might help, see the handout in this packet on our recommendations    * If your pain persists, please return to clinic. Future options include a walking boot, physical therapy, night splints, and injections.        Body Mass Index (BMI)  Many things can cause foot and ankle problems. Foot structure, activity level, foot mechanics and injuries are common causes of pain.  One very important issue that often goes unmentioned, is body weight.  Extra weight can cause increased stress on muscles, ligaments, bones and tendons.  Sometimes just a few extra pounds is all it takes to put one over her/his threshold. Without reducing that stress, it can be difficult to alleviate pain. Some people are uncomfortable addressing this issue, but we feel it is important for you to think about it. As Foot &  Ankle specialists, our job is addressing the lower extremity problem and possible causes. Regarding extra body weight, we encourage patients to discuss diet and weight management plans with their primary care doctors. It is this team approach that gives you the best opportunity for pain  "relief and getting you back on your feet.                Follow-ups after your visit        Your next 10 appointments already scheduled     Aug 15, 2017 11:00 AM CDT   (Arrive by 10:45 AM)   Return Seizure with Jeremías De Oliveira MD   [facility] ([facility] [place])    [address] [address] [phone]              Who to contact     If you have questions or need follow up information about today's clinic visit or your schedule please contact [facility] [place] directly at [phone].  Normal or non-critical lab and imaging results will be communicated to you by Maizhuohart, letter or phone within 4 business days after the clinic has received the results. If you do not hear from us within 7 days, please contact the clinic through Maizhuohart or phone. If you have a critical or abnormal lab result, we will notify you by phone as soon as possible.  Submit refill requests through [employer] or call your pharmacy and they will forward the refill request to us. Please allow 3 business days for your refill to be completed.          Additional Information About Your Visit        MyChart Information     [employer] gives you secure access to your electronic health record. If you see a primary care provider, you can also send messages to your care team and make appointments. If you have questions, please call your primary care clinic.  If you do not have a primary care provider, please call [phone] and they will assist you.        Care EveryWhere ID     This is your Care EveryWhere ID. This could be used by other organizations to access your [place] medical records  [id number]        Your Vitals Were     Pulse Height BMI (Body Mass Index)             74 5' 8\" (1.727 m) 27.06 kg/m2          Blood Pressure from Last 3 Encounters:   04/10/17 143/88   12/26/16 128/82   08/12/16 142/90    Weight from Last 3 Encounters:   07/26/17 178 lb (80.7 kg)   04/10/17 180 lb (81.6 " kg)   12/26/16 184 lb (83.5 kg)              Today, you had the following     No orders found for display       Primary Care Provider Office Phone # Fax #    Madison Cabral [phone]       [facility] [address] [facility]   [facility] [address]        Equal Access to Services     [place] campusLUCIAN : Hadii aad ku hadasho Soomaali, waaxda luqadaha, qaybta kaalmada adeegyada, waxay idiin hayaan adeeg kharash la'aan . So [facility] [phone].    ATENCIÓN: Si habla español, tiene a leigh disposición servicios gratuitos de asistencia lingüística. Mignon al [phone].    We comply with applicable federal civil rights laws and [place] laws. We do not discriminate on the basis of race, color, national origin, age, disability sex, sexual orientation or gender identity.            Thank you!     Thank you for choosing [facility]  for your care. Our goal is always to provide you with excellent care. Hearing back from our patients is one way we can continue to improve our services. Please take a few minutes to complete the written survey that you may receive in the mail after your visit with us. Thank you!             Your Updated Medication List - Protect others around you: Learn how to safely use, store and throw away your medicines at www.disposemymeds.org.          This list is accurate as of: 7/26/17 11:59 AM.  Always use your most recent med list.                   Brand Name Dispense Instructions for use Diagnosis    carBAMazepine 100 MG 12 hr capsule    CARBATROL    210 capsule    Take 3 caps (300mg) by mouth in AM and 4 caps (400mg) in PM    Generalized convulsive epilepsy (H)       IBUPROFEN PO      Take  by mouth as needed.        verapamil 80 MG tablet    CALAN    5 tablet    Take 1 tablet (80 mg) by mouth 3 times daily as needed    Paroxysmal supraventricular tachycardia (H)

## 2017-07-26 NOTE — LETTER
2017         RE: Marisela Beaver  [address] [place] [address]        Dear Colleague,    Thank you for referring your patient, Marisela Beaver, to the [facility]. Please see a copy of my visit note below.      ASSESSMENT/PLAN:    Encounter Diagnoses   Name Primary?     Pain of left heel Yes     Plantar fasciosis, left      Flat feet      Hallux abducto valgus, left      Pt educated about the cause and nature of heel pain.  Treatment plan discussed includes icing, calf and plantar fascial stretching, avoidance of barefoot walking, wearing sturdy, supportive athletic-type shoes, and activity modification.  Educational handouts provided.    She also asked about her bunion and if it was related to the heel pain.  I told her that a common denominator might be her flat foot.      I discussed the underlying cause of bunions and conservative recommendations.     Body mass index is 27.06 kg/(m^2).    Weight management plan: Patient was referred to their PCP to discuss a diet and exercise plan.      Rashawn An DPM, FACFAS, MS    [facility] of Podiatry/Foot & Ankle Surgery      ____________________________________________________________________    HPI:         Chief Complaint: left heel pain  Onset of problem: 5 days  Pain/ discomfort is described as:  Deep ache  Ratin/10   Frequency:  daily    The pain is made worse with walking  Previous treatment: ice, stretching    *  Past Medical History:   Diagnosis Date     Benign neoplasm of cerebral meninges (H)      Embolism and thrombosis of unspecified site     complication from meningioma removal     Iatrogenic pulmonary embolism and infarction (H)     complication from meningioma removal     Paroxysmal supraventricular tachycardia (H)      Thyrotoxicosis without mention of goiter or other cause, without mention of thyrotoxic crisis or storm     txd .w propylthyouarcil twice currently in remission   *  *  Past  "Surgical History:   Procedure Laterality Date     C EXCIS SUPRATENT MENINGIOMA  9/07    right frontal 8cm   *  *  Current Outpatient Prescriptions   Medication Sig Dispense Refill     verapamil (CALAN) 80 MG tablet Take 1 tablet (80 mg) by mouth 3 times daily as needed 5 tablet 3     carBAMazepine (CARBATROL) 100 MG 12 hr capsule Take 3 caps (300mg) by mouth in AM and 4 caps (400mg) in  capsule 11     IBUPROFEN PO Take  by mouth as needed.         ROS:     A 10-point review of systems was performed and is positive for that noted in the HPI and as seen below.  All other areas are negative.     Numbness in feet?  no   Calf pain with walking? no  Recent foot/ankle injury? no  Weight change over past 12 months? 8# loss  Self perception as overweight? yes  Recent flu-like symptoms? no  Joint pain other than feet ? no    Social History: Employment:  ;  Exercise/Physical activity:  Daily walking;  Tobacco use:  no  Social History     Social History     Marital status:      Spouse name: N/A     Number of children: N/A     Years of education: N/A     Occupational History     Not on file.     Social History Main Topics     Smoking status: Never Smoker     Smokeless tobacco: Never Used     Alcohol use 1.8 oz/week     3 Standard drinks or equivalent per week      Comment: occ     Drug use: No     Sexual activity: Yes     Partners: Male     Other Topics Concern     Parent/Sibling W/ Cabg, Mi Or Angioplasty Before 65f 55m? No     Social History Narrative       Family history:  Family History   Problem Relation Age of Onset     DIABETES Father      HEART DISEASE Father      CABG x 2 at 70     Thyroid Disease Father      graves disease     Hypertension Mother      CANCER Mother      breast cancer     Thyroid Disease Brother        Rheumatoid arthritis:  no  Foot Problems: mother -bunions  Diabetes: father      EXAM:    Vitals: Pulse 74  Ht 5' 8\" (1.727 m)  Wt 178 lb (80.7 kg)  BMI 27.06 " "kg/m2  BMI: Body mass index is 27.06 kg/(m^2).  Height: 5' 8\"    Constitutional/ general:  Pt is in no apparent distress, appears well-nourished.  Cooperative with history and physical exam.     Vascular:  Pedal pulses are palpable bilaterally for both the DP and PT arteries.  CFT < 3 sec.  No edema.  Pedal hair growth noted.     Neuro:  Alert and oriented x 3. Coordinated gait.  Light touch sensation is intact to the L4, L5, S1 distributions. No obvious deficits.  No evidence of neurological-based weakness, spasticity, or contracture in the lower extremities.     Derm: Normal texture and turgor.  No erythema, ecchymosis, or cyanosis.  No open lesions.     Musculoskeletal:    Lower extremity muscle strength is normal.  Patient is ambulatory without an assistive device or brace .  Decrease in medial longitudinal arch with weight bearing.   Hallux abducto valgus deformity, left foot.  Reducible in the transverse plane with preserved sagittal plane ROM.  No crepitus.      Pain on palpation to the plantar medial and plantar central aspect of the left heel.  No pain with   side-to-side compression of the heel.      Rashawn An DPM, FACFAS, MS    [place] [facility] of Podiatry/Foot & Ankle Surgery                Again, thank you for allowing me to participate in the care of your patient.        Sincerely,        Rashawn An DPM    "

## 2017-07-26 NOTE — NURSING NOTE
"Chief Complaint   Patient presents with     Consult     left heel pain / painful when walking x 5 days        Initial Pulse 74  Ht 5' 8\" (1.727 m)  Wt 178 lb (80.7 kg)  BMI 27.06 kg/m2 Estimated body mass index is 27.06 kg/(m^2) as calculated from the following:    Height as of this encounter: 5' 8\" (1.727 m).    Weight as of this encounter: 178 lb (80.7 kg).  Medication Reconciliation: complete    "

## 2017-07-28 DIAGNOSIS — G40.309 GENERALIZED CONVULSIVE EPILEPSY (H): ICD-10-CM

## 2017-08-01 RX ORDER — CARBAMAZEPINE 100 MG/1
CAPSULE, EXTENDED RELEASE ORAL
Qty: 630 CAPSULE | Refills: 11 | Status: SHIPPED | OUTPATIENT
Start: 2017-08-01 | End: 2017-09-08

## 2017-08-07 ENCOUNTER — TELEPHONE (OUTPATIENT)
Dept: INTERNAL MEDICINE | Facility: CLINIC | Age: 52
End: 2017-08-07

## 2017-08-07 NOTE — TELEPHONE ENCOUNTER
Panel Management Review      Patient has the following on her problem list: None      Composite cancer screening  Chart review shows that this patient is due/due soon for the following Pap Smear and Mammogram  Summary:    Patient is due/failing the following:   MAMMOGRAM and PAP    Action needed:   Patient needs office visit for as above.    Type of outreach:    Phone, left message for patient to call back.     Questions for provider review:    None                                                                                                                                    CORA Almaguer LPN       Chart routed to [facility].

## 2017-08-08 NOTE — TELEPHONE ENCOUNTER
Patient went to [facility] for mammogram and pap, both were normal per patient.  She cannot recall dates or MD she saw for pap but will be gathering that information and dropping off copy of results to clinic.  Mammogram done aprroximately Feb 2017  Pap done approximately April 2017.  LARISSA Estrada R.N.

## 2017-09-08 ENCOUNTER — OFFICE VISIT (OUTPATIENT)
Dept: NEUROLOGY | Facility: CLINIC | Age: 52
End: 2017-09-08

## 2017-09-08 VITALS
DIASTOLIC BLOOD PRESSURE: 82 MMHG | HEIGHT: 68 IN | WEIGHT: 182 LBS | SYSTOLIC BLOOD PRESSURE: 163 MMHG | BODY MASS INDEX: 27.58 KG/M2 | HEART RATE: 59 BPM

## 2017-09-08 DIAGNOSIS — G40.309 GENERALIZED CONVULSIVE EPILEPSY (H): ICD-10-CM

## 2017-09-08 DIAGNOSIS — D32.0 BENIGN NEOPLASM OF CEREBRAL MENINGES (H): Primary | ICD-10-CM

## 2017-09-08 LAB — CARBAMAZEPINE SERPL-MCNC: 8.7 MG/L (ref 4–12)

## 2017-09-08 RX ORDER — CARBAMAZEPINE 100 MG/1
CAPSULE, EXTENDED RELEASE ORAL
Qty: 630 CAPSULE | Refills: 11 | Status: SHIPPED | OUTPATIENT
Start: 2017-09-08 | End: 2018-09-10

## 2017-09-08 NOTE — LETTER
2017       RE: Marisela Beaver  [address] [place] [address]     Dear Colleague,    Thank you for referring your patient, Marisela Beaver, to the [facility] at [facility]. Please see a copy of my visit note below.    2017        Madison Cabral MD   [facility]    [address]   [place] [address]      RE: Marisela Beaver   MRN: [id number]   : 1965              Dear Dr. 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 Du in the right frontal area.  Her MRIs were reviewed and the last one was in , so she is due for one now in December.  The MRI does show considerable changes in the right frontal area that in comparison with the one from 2012 is unchanged in dimension characteristics.  This area will be prone for seizures.      Since last seen, she has had no episodes of seizures involving left arm or any other seizure type.  She has had no headaches.  Her overall health is good.  She is on the medication consisting of carbamazepine and her last level was done when she was here a year ago and this was 12.2.      She is on a relatively low dose of carbamazepine, taking 300 mg in the morning and 400 mg in the evening.  She is also on verapamil.  We will renew her prescription.        We examined her today.  She is an alert, pleasant, very intelligent woman.  She shows normal funduscopic exam.  Her skull scar is not tender and normal on the right side.  Blood pressure 163/82, pulse 59.  Fundus is normal.  Good strength of arms, no reflex asymmetry.  Sensory exam is normal.  Coordination is normal.      In summary, no seizures in the last year from surgery and right frontal injury and encephalomalacia from meningioma.  She is to continue on this medication.  We talked about interactions.  She is  conscious of that.  The patient will be having an MRI in 2017 which will be 2-1/2 years since her previous one.  We will see her in a year.  If there are any changes in the MRI with contrast we will see her sooner.        Sincerely,        Jeremías De Oliveira MD              D: 2017 10:31   T: 2017 11:45   MT: [email or web address]      Name:     HEMANTH LOPEZ   MRN:      [id number]        Account:      [id number]   :      1965           Service Date: 2017      Document: [id number]

## 2017-09-08 NOTE — MR AVS SNAPSHOT
After Visit Summary   9/8/2017    Marisela Beaver    MRN: [id number]           Patient Information     Date Of Birth          1965        Visit Information        Provider Department      9/8/2017 9:30 AM Jeremías De Oliveira MD [facility] Neurology        Today's Diagnoses     Benign neoplasm of cerebral meninges (H)    -  1    Generalized convulsive epilepsy (H)           Follow-ups after your visit        Follow-up notes from your care team     Return in about 1 year (around 9/8/2018).      Your next 10 appointments already scheduled     Dec 13, 2017 10:45 AM CST   (Arrive by 10:30 AM)   MR BRAIN W CONTRAST with [address] [facility] [place] [facility] ([facility] [place])    [address] [address] [phone]           Take your medicines as usual, unless your doctor tells you not to. Bring a list of your current medicines to your exam (including vitamins, minerals and over-the-counter drugs).  You will be given intravenous contrast for this exam. To prepare:   The day before your exam, drink extra fluids at least six 8-ounce glasses (unless your doctor tells you to restrict your fluids).   Have a blood test (creatinine test) within 30 days of your exam. Go to your clinic or Diagnostic Imaging Department for this test.  The MRI machine uses a strong magnet. Please wear clothes without metal (snaps, zippers). A sweatsuit works well, or we may give you a hospital gown.  Please remove any body piercings and hair extensions before you arrive. You will also remove watches, jewelry, hairpins, wallets, dentures, partial dental plates and hearing aids. You may wear contact lenses, and you may be able to wear your rings. We have a safe place to keep your personal items, but it is safer to leave them at home.   **IMPORTANT** THE INSTRUCTIONS BELOW ARE ONLY FOR THOSE PATIENTS WHO HAVE BEEN TOLD THEY WILL RECEIVE SEDATION OR GENERAL ANESTHESIA DURING THEIR  MRI PROCEDURE:  IF YOU WILL RECEIVE SEDATION (take medicine to help you relax during your exam):   You must get the medicine from your doctor before you arrive. Bring the medicine to the exam. Do not take it at home.   Arrive one hour early. Bring someone who can take you home after the test. Your medicine will make you sleepy. After the exam, you may not drive, take a bus or take a taxi by yourself.   No eating 8 hours before your exam. You may have clear liquids up until 4 hours before your exam. (Clear liquids include water, clear tea, black coffee and fruit juice without pulp.)  IF YOU WILL RECEIVE ANESTHESIA (be asleep for your exam):   Arrive 1 1/2 hours early. Bring someone who can take you home after the test. You may not drive, take a bus or take a taxi by yourself.   No eating 8 hours before your exam. You may have clear liquids up until 4 hours before your exam. (Clear liquids include water, clear tea, black coffee and fruit juice without pulp.)  Please call the Imaging Department at your exam site with any questions.              Who to contact     Please call your clinic at [phone] to:    Ask questions about your health    Make or cancel appointments    Discuss your medicines    Learn about your test results    Speak to your doctor   If you have compliments or concerns about an experience at your clinic, or if you wish to file a complaint, please contact [facility] Patient Relations at [phone] or email us at [email or web address]         Additional Information About Your Visit        CategoricalharBATS Information     [employer] gives you secure access to your electronic health record. If you see a primary care provider, you can also send messages to your care team and make appointments. If you have questions, please call your primary care clinic.  If you do not have a primary care provider, please call [phone] and they will assist you.      [employer] is an electronic  "gateway that provides easy, online access to your medical records. With [employer], you can request a clinic appointment, read your test results, renew a prescription or communicate with your care team.     To access your existing account, please contact your [facility] or call [phone] for assistance.        Care EveryWhere ID     This is your Care EveryWhere ID. This could be used by other organizations to access your [facility] medical records  [id number]        Your Vitals Were     Pulse Height BMI (Body Mass Index)             59 1.727 m (5' 8\") 27.67 kg/m2          Blood Pressure from Last 3 Encounters:   No data found for BP    Weight from Last 3 Encounters:   No data found for Wt              Today, you had the following     No orders found for display         Today's Medication Changes          These changes are accurate as of: 9/8/17 11:59 PM.  If you have any questions, ask your nurse or doctor.               These medicines have changed or have updated prescriptions.        Dose/Directions    carBAMazepine 100 MG 12 hr capsule   Commonly known as:  CARBATROL   This may have changed:  See the new instructions.   Used for:  Generalized convulsive epilepsy (H)   Changed by:  Jeremías De Oliveira MD        Take 3 capsules by mouth in the morning and 4 capsules  in the evening   Quantity:  630 capsule   Refills:  11            Where to get your medicines      These medications were sent to [facility] - [address], [facility] [address]     Phone:  [phone]     carBAMazepine 100 MG 12 hr capsule                Primary Care Provider Office Phone # Fax #    Madison Cabral [phone]       [address] [id number]        Equal Access to Services     PEDRO ABAD AH: Hadii jadon salaso Sostephanie, waaxda luqadaha, qaybta kaallinda le " laneftali white. So [facility] [phone].    ATENCIÓN: Si habla agueda, tiene a leigh disposición servicios gratuitos de asistencia lingüística. Mignon al [phone].    We comply with applicable federal civil rights laws and [place] laws. We do not discriminate on the basis of race, color, national origin, age, disability sex, sexual orientation or gender identity.            Thank you!     Thank you for choosing [facility]UROLOG[facility]  for your care. Our goal is always to provide you with excellent care. Hearing back from our patients is one way we can continue to improve our services. Please take a few minutes to complete the written survey that you may receive in the mail after your visit with us. Thank you!             Your Updated Medication List - Protect others around you: Learn how to safely use, store and throw away your medicines at www.disposemymeds.org.          This list is accurate as of: 9/8/17 11:59 PM.  Always use your most recent med list.                   Brand Name Dispense Instructions for use Diagnosis    carBAMazepine 100 MG 12 hr capsule    CARBATROL    630 capsule    Take 3 capsules by mouth in the morning and 4 capsules  in the evening    Generalized convulsive epilepsy (H)       IBUPROFEN PO      Take  by mouth as needed.        verapamil 80 MG tablet    CALAN    5 tablet    Take 1 tablet (80 mg) by mouth 3 times daily as needed    Paroxysmal supraventricular tachycardia (H)

## 2017-09-08 NOTE — PROGRESS NOTES
2017        Madison Cabral MD   [facility]    [address] [facility] [address]   [place] [address]      RE: Marisela Beaver   MRN: [id number]   : 1965              Dear Dr. 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 Du in the right frontal area.  Her MRIs were reviewed and the last one was in , so she is due for one now in December.  The MRI does show considerable changes in the right frontal area that in comparison with the one from 2012 is unchanged in dimension characteristics.  This area will be prone for seizures.      Since last seen, she has had no episodes of seizures involving left arm or any other seizure type.  She has had no headaches.  Her overall health is good.  She is on the medication consisting of carbamazepine and her last level was done when she was here a year ago and this was 12.2.      She is on a relatively low dose of carbamazepine, taking 300 mg in the morning and 400 mg in the evening.  She is also on verapamil.  We will renew her prescription.        We examined her today.  She is an alert, pleasant, very intelligent woman.  She shows normal funduscopic exam.  Her skull scar is not tender and normal on the right side.  Blood pressure 163/82, pulse 59.  Fundus is normal.  Good strength of arms, no reflex asymmetry.  Sensory exam is normal.  Coordination is normal.      In summary, no seizures in the last year from surgery and right frontal injury and encephalomalacia from meningioma.  She is to continue on this medication.  We talked about interactions.  She is conscious of that.  The patient will be having an MRI in 2017 which will be 2-1/2 years since her previous one.  We will see her in a year.  If there are any changes in the MRI with contrast we will see her sooner.        Sincerely,        MD EDSON Sears MD              D: 2017 10:31   T: 2017 11:45   MT: tb      Name:     HEMANTH LOPEZ   MRN:      [id number]        Account:      [id number]   :      1965           Service Date: 2017      Document: [id number]

## 2017-12-13 ENCOUNTER — RADIANT APPOINTMENT (OUTPATIENT)
Dept: MRI IMAGING | Facility: CLINIC | Age: 52
End: 2017-12-13
Attending: PSYCHIATRY & NEUROLOGY
Payer: COMMERCIAL

## 2017-12-13 ENCOUNTER — APPOINTMENT (OUTPATIENT)
Dept: LAB | Facility: CLINIC | Age: 52
End: 2017-12-13
Payer: COMMERCIAL

## 2017-12-13 DIAGNOSIS — D32.0 BENIGN NEOPLASM OF CEREBRAL MENINGES (H): ICD-10-CM

## 2017-12-13 RX ORDER — GADOBUTROL 604.72 MG/ML
10 INJECTION INTRAVENOUS ONCE
Status: COMPLETED | OUTPATIENT
Start: 2017-12-13 | End: 2017-12-13

## 2017-12-13 RX ADMIN — GADOBUTROL 7.5 ML: 604.72 INJECTION INTRAVENOUS at 10:49

## 2017-12-13 NOTE — DISCHARGE INSTRUCTIONS
MRI Contrast Discharge Instructions    The IV contrast you received today will pass out of your body in your  urine. This will happen in the next 24 hours. You will not feel this process.  Your urine will not change color.    Drink at least 4 extra glasses of water or juice today (unless your doctor  has restricted your fluids). This reduces the stress on your kidneys.  You may take your regular medicines.    If you are on dialysis: It is best to have dialysis today.    If you have a reaction: Most reactions happen right away. If you have  any new symptoms after leaving the hospital (such as hives or swelling),  call your hospital at the correct number below. Or call your family doctor.  If you have breathing distress or wheezing, call 911.    Special instructions: ***    I have read and understand the above information.    Signature:______________________________________ Date:___________    Staff:__________________________________________ Date:___________     Time:__________    [place] [facility] Departments:    ___[place]: [phone]  ___[facility]: [phone]  ___[place]: [phone] ___[facility]: [phone]  ___[facility]: [phone]  ___[facility]: [phone]  ___[phone]  ___[facility]: [phone]  ___[phone]

## 2018-02-14 ENCOUNTER — TRANSFERRED RECORDS (OUTPATIENT)
Dept: HEALTH INFORMATION MANAGEMENT | Facility: CLINIC | Age: 53
End: 2018-02-14

## 2018-04-12 ENCOUNTER — TELEPHONE (OUTPATIENT)
Dept: NEUROLOGY | Facility: CLINIC | Age: 53
End: 2018-04-12

## 2018-04-12 NOTE — TELEPHONE ENCOUNTER
[facility] Call Center    Phone Message    May a detailed message be left on voicemail: yes    Reason for Call: Other: PT will be faxing over a  eligibility form to [phone].  The form needs to be filled out by Dr. De Oliveira and must be completed, signed and sent back by no later than 4/27/18.       Action Taken: Message routed to:  [facility] ([facility]): .

## 2018-04-17 NOTE — TELEPHONE ENCOUNTER
DMV Form received from patient via fax, DMV Form Completed, DMV Form signed by MD, faxed to [place] Dept of Public Safety, Copy sent to be scanned into EHR, and copy sent to patient at address on file.  Phone call made to patient to inform them.

## 2018-05-04 ENCOUNTER — TELEPHONE (OUTPATIENT)
Dept: INTERNAL MEDICINE | Facility: CLINIC | Age: 53
End: 2018-05-04

## 2018-05-04 NOTE — TELEPHONE ENCOUNTER
Please abstract the following data from this visit with this patient into the appropriate field in Epic:    Mammogram done on this date: 2/14/2018 (approximately), by this group: [facility] , results were normal. Everything was fine.

## 2018-07-24 ENCOUNTER — TELEPHONE (OUTPATIENT)
Dept: CARDIOLOGY | Facility: CLINIC | Age: 53
End: 2018-07-24

## 2018-07-24 NOTE — TELEPHONE ENCOUNTER
[facility] Call Center    Phone Message    May a detailed message be left on voicemail: yes    Reason for Call: Other: Patient's order for her echo has , please renew and call once updated so patient can schedule     Action Taken: Message routed to:  Adult Clinics: Cardiology p [id number]

## 2018-07-25 ENCOUNTER — CARE COORDINATION (OUTPATIENT)
Dept: CARDIOLOGY | Facility: CLINIC | Age: 53
End: 2018-07-25

## 2018-09-10 ENCOUNTER — OFFICE VISIT (OUTPATIENT)
Dept: NEUROLOGY | Facility: CLINIC | Age: 53
End: 2018-09-10
Payer: COMMERCIAL

## 2018-09-10 VITALS
DIASTOLIC BLOOD PRESSURE: 81 MMHG | OXYGEN SATURATION: 100 % | BODY MASS INDEX: 28.04 KG/M2 | SYSTOLIC BLOOD PRESSURE: 144 MMHG | HEART RATE: 50 BPM | WEIGHT: 185 LBS | HEIGHT: 68 IN

## 2018-09-10 DIAGNOSIS — G40.309 GENERALIZED CONVULSIVE EPILEPSY (H): ICD-10-CM

## 2018-09-10 LAB — CARBAMAZEPINE SERPL-MCNC: 8.3 MG/L (ref 4–12)

## 2018-09-10 RX ORDER — CARBAMAZEPINE 100 MG/1
CAPSULE, EXTENDED RELEASE ORAL
Qty: 630 CAPSULE | Refills: 11 | Status: SHIPPED | OUTPATIENT
Start: 2018-09-10 | End: 2019-09-16

## 2018-09-10 ASSESSMENT — ENCOUNTER SYMPTOMS
EXERCISE INTOLERANCE: 0
HYPERTENSION: 0
HYPOTENSION: 0
ORTHOPNEA: 0
SLEEP DISTURBANCES DUE TO BREATHING: 0
LEG PAIN: 0
SYNCOPE: 0
PALPITATIONS: 1
LIGHT-HEADEDNESS: 0

## 2018-09-10 ASSESSMENT — PAIN SCALES - GENERAL: PAINLEVEL: NO PAIN (0)

## 2018-09-10 NOTE — NURSING NOTE
Chief Complaint   Patient presents with     RECHECK     SEIZURES 1 YEAR FOLLOW UP       Latisha Mcgarry MA

## 2018-09-10 NOTE — LETTER
9/10/2018       RE: Marisela Beaver  [address] [place] [facility] [address]     Dear Colleague,    Thank you for referring your patient, Marisela Beaver, to the [facility] NEUROLOGY at [facility]. Please see a copy of my visit note below.    Service Date: 09/10/2018        RE: Marisela Beaver   MRN: [id number]   : 1965      Dear Dr. Cabral:      Mrs. Beaver is a very pleasant 53-year-old woman that underwent resection of a large right frontal meningioma approximately 11 years ago and has had no evidence of recurrence.      I follow her for seizure for the last few years.  She has had no seizures over that period of time either.  She has been maintained on carbamazepine, initially was on Carbatrol but went to generic carbamazepine and has done also well.  She was previously seen in September of last year and we did order an MRI at that time.      The MRI was reviewed and looked at and compare with previous and there seems to be no evidence of recurrence of the right frontal meningioma.  As noted, she has in addition to right frontal cystic encephalomalacia, also has changes in the left frontal polar region of the brain, but relatively small.      The patient returns and reports no new seizures, auras or any particular difficulty with the left or right arm.  She has been taking the carbamazepine (Carbatrol) 300 in the morning and 400 in the evening.  Her last concentration last year was 8.7.  She uses p.r.n. verapamil for supraventricular tachycardia.  Her health has been good.  There has been no interaction or health issues.      PHYSICAL EXAMINATION:   GENERAL:   She is a very pleasant woman.     VITAL SIGNS:   She has a pressure reading today of 144/81.  Pulse is 50.     NEUROLOGIC:   Mental status exam is normal.  Cranial surgical area is negative for any abnormalities to palpation or any tenderness.  Cranial nerves are normal with funduscopic exam  completely normal.  Normal extraocular movements.  Good convergence, pupil response.  Strength is good and coordination, reflexes are normoactive, equal.  There is no snout reflex present.        In summary, she has had no seizures over the last year.  Her repeat MRI showed no signs of recurrence.  We talked and spent about 15 minutes on education about the tumor recurrence and its occurrence and she was very glad to hear that.  She has been driving and it has been extended, that it does not need to be reviewed every year.  We will keep that in mind when the next renewal come up.  We talked about interaction issues with erythromycin and carbamazepine.  She understands that.      Thank you for sending her to Neurology.         D: 09/10/2018   T: 09/10/2018   MT: AKA      Name:     HEMANTH LOPEZ   MRN:      [id number]        Account:      [id number]   :      1965           Service Date: 09/10/2018      Document: [id number]       Again, thank you for allowing me to participate in the care of your patient.      Sincerely,    Jeremías De Oliveira MD    CC:  Madison Cabral MD   [facility]    [address]   [place] [address]

## 2018-09-10 NOTE — PROGRESS NOTES
Service Date: 09/10/2018      Madison Cabral MD   [facility]    [address]   [place] [address]      RE: Marisela Beaver   MRN: [id number]   : 1965      Dear Dr. Cabral:      Mrs. Beaver is a very pleasant 53-year-old woman that underwent resection of a large right frontal meningioma approximately 11 years ago and has had no evidence of recurrence.      I follow her for seizure for the last few years.  She has had no seizures over that period of time either.  She has been maintained on carbamazepine, initially was on Carbatrol but went to generic carbamazepine and has done also well.  She was previously seen in September of last year and we did order an MRI at that time.      The MRI was reviewed and looked at and compare with previous and there seems to be no evidence of recurrence of the right frontal meningioma.  As noted, she has in addition to right frontal cystic encephalomalacia, also has changes in the left frontal polar region of the brain, but relatively small.      The patient returns and reports no new seizures, auras or any particular difficulty with the left or right arm.  She has been taking the carbamazepine (Carbatrol) 300 in the morning and 400 in the evening.  Her last concentration last year was 8.7.  She uses p.r.n. verapamil for supraventricular tachycardia.  Her health has been good.  There has been no interaction or health issues.      PHYSICAL EXAMINATION:   GENERAL:   She is a very pleasant woman.     VITAL SIGNS:   She has a pressure reading today of 144/81.  Pulse is 50.     NEUROLOGIC:   Mental status exam is normal.  Cranial surgical area is negative for any abnormalities to palpation or any tenderness.  Cranial nerves are normal with funduscopic exam completely normal.  Normal extraocular movements.  Good convergence, pupil response.  Strength is good and coordination, reflexes are normoactive, equal.  There is no snout reflex present.         In summary, she has had no seizures over the last year.  Her repeat MRI showed no signs of recurrence.  We talked and spent about 15 minutes on education about the tumor recurrence and its occurrence and she was very glad to hear that.  She has been driving and it has been extended, that it does not need to be reviewed every year.  We will keep that in mind when the next renewal come up.  We talked about interaction issues with erythromycin and carbamazepine.  She understands that.      Thank you for sending her to Neurology.      Sincerely,      MD EDSON Dennis MD             D: 09/10/2018   T: 09/10/2018   MT: AKA      Name:     HEMNATH LOPEZ   MRN:      [id number]        Account:      [id number]   :      1965           Service Date: 09/10/2018      Document: [id number]

## 2018-09-10 NOTE — MR AVS SNAPSHOT
"              After Visit Summary   9/10/2018    Marisela Beaver    MRN: [id number]           Patient Information     Date Of Birth          1965        Visit Information        Provider Department      9/10/2018 9:00 AM Jeremías De Oliveira MD [facility] Neurology        Today's Diagnoses     Generalized convulsive epilepsy (H)           Follow-ups after your visit        Follow-up notes from your care team     Return in about 1 year (around 9/10/2019).      Who to contact     Please call your clinic at [phone] to:    Ask questions about your health    Make or cancel appointments    Discuss your medicines    Learn about your test results    Speak to your doctor            Additional Information About Your Visit        CBA PHARMAhart Information     [facility] gives you secure access to your electronic health record. If you see a primary care provider, you can also send messages to your care team and make appointments. If you have questions, please call your primary care clinic.  If you do not have a primary care provider, please call [phone] and they will assist you.      [facility] is an electronic gateway that provides easy, online access to your medical records. With [facility], you can request a clinic appointment, read your test results, renew a prescription or communicate with your care team.     To access your existing account, please contact your [facility] or call [phone] for assistance.        Care EveryWhere ID     This is your Care EveryWhere ID. This could be used by other organizations to access your [facility] medical records  [id number]        Your Vitals Were     Pulse Height Pulse Oximetry BMI (Body Mass Index)          50 1.727 m (5' 8\") 100% 28.13 kg/m2         Blood Pressure from Last 3 Encounters:   09/10/18 144/81   09/08/17 163/82   04/10/17 143/88    Weight from Last 3 Encounters:   09/10/18 83.9 kg (185 lb)   09/08/17 82.6 kg (182 lb)   07/26/17 80.7 kg (178 " lb)                 Where to get your medicines      These medications were sent to [facility] - [address]  [address], [facility] [address]     Phone:  [phone]     carBAMazepine 100 MG 12 hr capsule          Primary Care Provider Office Phone # Fax #    Madison Cabral [phone]       [address] [facility] [address]  [facility] [address]        Equal Access to Services     [facility]: Hadii aad ku hadasho Soomaali, waaxda luqadaha, qaybta kaalmada adeegyada, waxay idiin hayaan adeeg kharash la'aan . So [facility] [phone].    ATENCIÓN: Si habla español, tiene a leigh disposición servicios gratuitos de asistencia lingüística. [facility] [phone].    We comply with applicable federal civil rights laws and [place] laws. We do not discriminate on the basis of race, color, national origin, age, disability, sex, sexual orientation, or gender identity.            Thank you!     Thank you for choosing [facility] NEUROLOGY  for your care. Our goal is always to provide you with excellent care. Hearing back from our patients is one way we can continue to improve our services. Please take a few minutes to complete the written survey that you may receive in the mail after your visit with us. Thank you!             Your Updated Medication List - Protect others around you: Learn how to safely use, store and throw away your medicines at www.disposemymeds.org.          This list is accurate as of 9/10/18 11:59 PM.  Always use your most recent med list.                   Brand Name Dispense Instructions for use Diagnosis    carBAMazepine 100 MG 12 hr capsule    CARBATROL    630 capsule    Take 3 capsules by mouth in the morning and 4 capsules  in the evening    Generalized convulsive epilepsy (H)       IBUPROFEN PO      Take  by mouth as needed.        verapamil 80 MG tablet    CALAN    5 tablet    Take 1 tablet (80 mg) by mouth 3 times daily as needed     Paroxysmal supraventricular tachycardia (H)

## 2018-09-11 ENCOUNTER — HOSPITAL ENCOUNTER (OUTPATIENT)
Dept: CARDIOLOGY | Facility: CLINIC | Age: 53
Discharge: HOME | End: 2018-09-11
Attending: INTERNAL MEDICINE | Admitting: INTERNAL MEDICINE
Payer: COMMERCIAL

## 2018-09-11 DIAGNOSIS — I47.10 PAROXYSMAL SUPRAVENTRICULAR TACHYCARDIA (H): ICD-10-CM

## 2018-09-11 PROCEDURE — 93306 TTE W/DOPPLER COMPLETE: CPT

## 2018-09-11 PROCEDURE — 93306 TTE W/DOPPLER COMPLETE: CPT | Mod: 26 | Performed by: INTERNAL MEDICINE

## 2019-02-18 ENCOUNTER — TRANSFERRED RECORDS (OUTPATIENT)
Dept: HEALTH INFORMATION MANAGEMENT | Facility: CLINIC | Age: 54
End: 2019-02-18

## 2019-05-03 ENCOUNTER — HEALTH MAINTENANCE LETTER (OUTPATIENT)
Age: 54
End: 2019-05-03

## 2019-05-10 ENCOUNTER — TRANSFERRED RECORDS (OUTPATIENT)
Dept: HEALTH INFORMATION MANAGEMENT | Facility: CLINIC | Age: 54
End: 2019-05-10

## 2019-08-07 ENCOUNTER — OFFICE VISIT (OUTPATIENT)
Dept: PODIATRY | Facility: CLINIC | Age: 54
End: 2019-08-07
Payer: COMMERCIAL

## 2019-08-07 VITALS
WEIGHT: 187 LBS | DIASTOLIC BLOOD PRESSURE: 72 MMHG | BODY MASS INDEX: 28.34 KG/M2 | SYSTOLIC BLOOD PRESSURE: 118 MMHG | HEIGHT: 68 IN

## 2019-08-07 DIAGNOSIS — M20.12 VALGUS DEFORMITY OF BOTH GREAT TOES: Primary | ICD-10-CM

## 2019-08-07 DIAGNOSIS — M20.11 VALGUS DEFORMITY OF BOTH GREAT TOES: Primary | ICD-10-CM

## 2019-08-07 DIAGNOSIS — M77.41 METATARSALGIA OF RIGHT FOOT: ICD-10-CM

## 2019-08-07 PROCEDURE — 99213 OFFICE O/P EST LOW 20 MIN: CPT | Performed by: PODIATRIST

## 2019-08-07 ASSESSMENT — MIFFLIN-ST. JEOR: SCORE: 1496.73

## 2019-08-07 NOTE — PATIENT INSTRUCTIONS
Thank you for choosing [facility]!    DR. LYNCH'S CLINIC LOCATIONS     MONDAY - [place] WEDNESDAY (AM ONLY) - [place]   [address] [place] [address] [id number] [address]   [phone] / [phone] / [phone]       THURSDAY - [place] SCHEDULE SURGERY: [phone]   [address] APPOINTMENTS: [phone]   [place] [address] BILLING QUESTIONS: [phone] / [phone] TRIAGE NURSE: [phone]       CAPSULITIS / METATARSALGIA  All joints in the body are surrounded by a capsule, or a covering of soft tissue and ligaments. The capsule holds bones together and secretes joint fluid to help lubricate the joint. If a joint capsule is exposed to excessive force, it can develop microscopic tears and become inflamed. This commonly occurs in the foot due to mild variation in anatomy. Hammertoes, bunions, irregular bone length, joint immobility, etc. can all lead to excessive force on the joint. Capsule injury can also occur due to repetitive stress from exercise, insufficient support from shoes, excessive bare foot walking and excessive weight.      Conservative treatments include ice, rest from the aggravating activity, weight loss, orthotic inserts, improving shoes and shoe modifications. Appropriate shoes will protect the inflamed tissue improving the chances of healing. Avoidance of standing or walking barefoot, including around the house, is necessary to allow healing. Casts are sometimes used for more aggressive protection.  NSAIDs such as Advil are also used to help with pain and decreasing inflammation. If pain continues over a period of weeks with continuous rest and icing, Corticosteroid injections can be a treatment option to try and help decrease inflammation.    Surgery is often necessary to correct the underlying structural problem. Surgery might include shortening an excessively long bone,  repairing bunion or hammertoe, lengthening a tight Achilles  tendon, etc. These are same day surgeries that might be pursued if more conservative measures fail to provide relief.      The inflamed joint capsule has the potential to completely tear. This will allow the toe to drift off the ground, curving toward the other toes. The involved toe may under or overlap the adjacent toes as drift continues. The pain may improve after the joint tears or this new position will be permanent. Surgery can address the toe alignment. Your goal of treating capsulitis is to avoid this scenario.          BUNION (HALLUX ABDUCTO VALGUS)  A bunion is caused by muscle imbalance. The great toe is pulled toward the smaller toes. The metatarsal head is pushed outward creating a lump on the side of your foot. Imbalance is the result of foot structure and instability.   Bunions do not improve with time. They usually enlarge, however this is a fairly slow process. Shoes do not necessarily cause bunions, however, they can hasten development and definitely cause bunions to hurt.   Bunions often run in families. We inherit a certain foot structure, which may be predisposed to bunion development.   Bunion pain is likely a combination of shoes rubbing on the bump, nerve irritation, compression between the toes, joint misalignment, arthritis and altered gait.   SYMPTOMS   Bunions are usually termed mild, moderate or severe. Just because you have a bunion does not mean you have to have pain. There are some people with very severe bunions and no pain and people with mild bunions and a lot of pain.   - Pain on the inside of your foot at the big toe joint (1st MTPJ)   - Swelling on the inside of your foot at the big toe joint   - Redness on the inside of your foot at the big toe joint   - Numbness or burning in the big toe (hallux)   - Decreased motion at the big toe joint   - Painful bursa (fluid-filled sac) on the inside of your foot at the big toe  joint   - Pain while wearing shoes -especially shoes too narrow or with high heels    - Pain during activities   - Corn in between the big toe and second toe   - Callous formation on the side or bottom of the big toe or big toe joint   - Callous under the second toe joint (2nd MTPJ)   - Pain in the second toe joint   TREATMENT  Conservative (non-surgical) treatment will not make the bunion go away, but it will hopefully decrease the signs and symptoms you have and help you get rid of the pain and get you back to your activities.   1.  Wider shoes or extra depth shoes: Most bunion pain can be improved simply by wearing compatible shoes. People with bunions cannot choose footwear simply because they like the style. Your bunion should determine which shoes are to be worn. Wide shoes with nonirritating seams,soft leather and a square toe box are most compatible with a bunion. Shoes should fit appropriately right out of the box but may need to be professionally stretched and modified to accommodate the bump. Heels, dress shoes and shoes with pointed toes will not be comfortable.   2. NSAIDs   3.  Arch supports, custom inserts, padding, splints, toe spacers : Most bunion pain can be improved simply by wearing compatible shoes. People with bunions cannot choose footwear simply because they like the style. Your bunion should determine which shoes are to be worn. Wide shoes with nonirritating seams,soft leather and a square toe box are most compatible with a bunion. Shoes should fit appropriately right out of the box but may need to be professionally stretched and modified to accommodate the bump. Heels, dress shoes and shoes with pointed toes will not be comfortable.   4.  Change activities   5.  Physical therapy  SURGERY  Surgical treatment for bunions is sometimes needed. If you are limited by pain, cannot fit in shoes comfortably and are not able to do your daily activities then surgery may be a good option for you.  There are many different surgical procedures to repair bunions. Your foot and ankle surgeon will review your foot exam findings, your x-rays, your age, your health, your lifestyle, your physical activity level and discuss with you which procedure he or she would recommend. Surgical procedures for bunions range from soft tissue repair to cutting and realigning the bones. It is not recommended that you have bunion surgery for cosmetic reasons (you do not like how your foot looks) or because you want to fit in a certain pair of shoes; There is the risk that even after surgery, the bunion will reoccur 9-10% of the time.   Bunion surgery involves cutting and repositioning the bones surrounding the bunion. Pins and screws are used to hold the bones in place during the healing process. The goal of bunion surgery is to reduce the size of the bunion bump. Realignment of the toe and joint is attempted.     Some first toes cannot be forced back into normal alignment even with surgery. Surgery is helpful in most cases but does not necessarily create a normal foot.   Healing after surgery requires about six weeks of protection. This allows the bone to heal. Maximum recovery takes about one year. The scar tissue and jOint structures require this amount of time to finish the healing process. Expect stiffness, swelling and numbness during that time frame. Bunion surgery does involve side effects. Some side effects are predictable and others are less common but do occur. A scar will be visible and could be irritated by shoes. The shoe may rub on the screw or internal pin requiring surgical removal of these fixation devices. The screw and pin would likely be left in place for a full year. The first toe may loose motion after bunion surgery. The amount of stiffness is variable. Some people never regain normal motion of the first toe. This is due to scar tissue inherent to any surgery. The first toe may drift toward the second toe or  away from the second toe. Spreading of the first and second toes is a rare occurrence after bunion surgery. This can be quite bothersome and would need to be surgically repaired. Toe drift toward the second toe could result in a recurrent bunion and revision surgery. Joint fusion is one option to correct an unstable, drifting toe. This procedure straightens the toe, however, no motion remains. Fusion may be necessary to correct complications of bunion surgery or as the original procedure in severe cases.   All surgical procedures involve risk of infection, numbness, pain, delayed healing, osteotomy dislocation, blood clots, continued foot pain, etc. Bunion surgery is quite complex and should not be taken lightly.   Any skin incision can lead to infection. Deep infection might involve the bone and thus repeat surgery and six weeks of IV antibiotics. Scar tissue can cause nerve pain or numbness. This is generally temporary but can be permanent. We do not have treatments that cure nerve problems. Second toe pain could be related to altered mechanics and pressure transferred to the second toe. Most feet with bunions have pre-existing second toe problems. Delayed bone healing would lengthen the healing time. Some bones simply do not heal. This requires repeat surgery, electronic bone stimulation and/or extended protection. Smokers have an approximate 20% chance of poor bone healing. This is double that of a non-smoker. The bone cut may displace. This may need to be repaired with a second operation. Displacement can cause jOint malalignment. Immobility after surgery can cause blood clots in the legs and lungs. This could result in death.   Foot pain is complex. Most feet hurt for more than one reason. Fixing the bunion would not necessarily create a pain free foot. Appropriate shoes, healthy body weight, avoidance of bare foot walking and moderation of activity will always be necessary to enjoy foot comfort. Your bunion  may involve arthritis, which is incurable even with surgery. Long standing bunions often involve chronic irritation to the surrounding nerves. Nerve pain may not resolve even with reducing the bunion bump since permanent nerve damage may be present   Bunion surgery is nevertheless quite successful. Most surgical patients are pleased with their foot following bunion surgery. Many of the issues described above can be controlled by taking proper care of your foot during the healing process.   Your surgeon would be happy to fully describe any of the above issues. You should pursue a full understanding of the operation,recovery process and any potential problems that could develop.   PREVENTION  1.  Do not wear high heels if there is a family history of bunions.  2.  Wear shoes that have enough width and depth in the toe box  Here are exercises that may benefit people with bunions:   Toe stretches - Stretching out your toes can help keep them limber and offset foot pain. To stretch your toes, point your toes straight ahead for 5 seconds and then curl them under for 5 seconds. Repeat these stretches 10 times. These exercises can be especially beneficial if you also have hammertoes, or chronically bent toes, in addition to a bunion.   Toe flexing and masood - Press your toes against a hard surface such as a wall, to flex and stretch them; hold the position for 10 seconds and repeat three to four times. Then flex your toes in the opposite direction; hold the position for 10 seconds and repeat three to four times.   Stretching your big toe - Using your fingers to gently pull your big toe over into proper alignment can be helpful as well. Hold your toe in position for 10 seconds and repeat three to four times.   Resistance exercises - Wrap either a towel or belt around your big toe and use it to pull your big toe toward you while simultaneously pushing forward, against the towel, with your big toe.   Ball roll - To  massage the bottom of your foot, sit down, place a golf ball on the floor under your foot, and roll it around under your foot for two minutes. This can help relieve foot strain and cramping.   Towel curls - You can strengthen your toes by spreading out a small towel on the floor, curling your toes around it, and pulling it toward you. Repeat five times. Gripping objects with your toes like this can help keep your foot flexible.   Picking up marbles - Another gripping exercise you can perform to keep your foot flexible is picking up marbles with your toes. Do this by placing 20 marbles on the floor in front of you and use your foot to pick the marbles up one by one and place them in a bowl.   Walking along the beach - Whenever possible, spend time walking on sand. This can give you a gentle foot massage and also help strengthen your toes. This is especially beneficial for people who have arthritis associated with their bunions.     ADHESIVE METATARSAL PADS

## 2019-08-07 NOTE — PROGRESS NOTES
ASSESSMENT/PLAN:    Encounter Diagnoses   Name Primary?     Valgus deformity of both great toes Yes     Metatarsalgia of right foot      Conservative cares were reviewed including improved foot support, proper shoes, anti-inflammatory measures, padding, and the avoidance of barefoot walking.  An informational handout on bunions provided.      Surgical intervention was also briefly discussed.      I explained how the right 2nd metatarsophalangeal joint pain is related to her bunion. We discussed metatarsal padding.   Rigid soled shoes were recommended to offload the forefoot during the propulsive phase of gait.     I explained that toenail changes are often from injury to a nail unit, rather than from fungus.  Injury might be a one-time event or from repetitive irritation in foot wear and with certain types of activities.  Injured nails are likely more susceptible to fungal infection.  Change seen in multiple nails is more likely from fungus.  Treatment options are limited.     It was explained that many people opt to simply keep the involved nails trimmed and filed. [facility] does not offer this service.  Another option is a trial of a topical and/or oral antifungal.  Many times these are not successful nor provide a cure.  These medications do not correct a nail deformity.      Permanent removal of deformed toenails is an option.        Body mass index is 28.43 kg/m .    Weight management plan: Patient was referred to their PCP to discuss a diet and exercise plan.      Rashawn An DPM, FACFAS, MS    [facility]      ____________________________________________________________________    HPI:         Chief Complaint: bunion pain, both  Onset of problem: years  Pain/ discomfort is described as:  Deep ache  Ratin/10   Frequency:  intermittent    The pain is made worse with wearing sandals, flip flops  Previous treatment: ice, rest    She reports some pain on  the plantar right foot near the 2nd metatarsophalangeal joint.  She also mentions fungus in her left 2nd toenail.   *  Patient Active Problem List   Diagnosis     Thyrotoxicosis     Iatrogenic pulmonary embolism and infarction (H)     Embolism and thrombosis (H)     Benign neoplasm of cerebral meninges (H)     PTSD (post-traumatic stress disorder)     Seizure disorder (H)     CARDIOVASCULAR SCREENING; LDL GOAL LESS THAN 160     Health Care Home     Bunion of great toe     Posterior tibial tendon dysfunction     Pes planus     Tibialis tendinitis     Ankle pain     Paroxysmal supraventricular tachycardia (H)   *  *  Past Surgical History:   Procedure Laterality Date     C EXCIS SUPRATENT MENINGIOMA  9/07    right frontal 8cm   *  *  Current Outpatient Medications   Medication Sig Dispense Refill     carBAMazepine (CARBATROL) 100 MG 12 hr capsule Take 3 capsules by mouth in the morning and 4 capsules  in the evening 630 capsule 11     IBUPROFEN PO Take  by mouth as needed.       verapamil (CALAN) 80 MG tablet Take 1 tablet (80 mg) by mouth 3 times daily as needed 5 tablet 3       ROS:     A 10-point review of systems was performed and is positive for that noted above in the HPI and as seen below.  All other areas are negative.     Numbness in feet?  no   Calf pain with walking? no  Recent foot/ankle injury? no  Weight change over past 12 months? 5# gain  Self perception as overweight? yes  Recent flu-like symptoms? no  Joint pain other than feet ? no    Social History:  Social History     Socioeconomic History     Marital status:      Spouse name: Not on file     Number of children: Not on file     Years of education: Not on file     Highest education level: Not on file   Occupational History     Not on file   Social Needs     Financial resource strain: Not on file     Food insecurity:     Worry: Not on file     Inability: Not on file     Transportation needs:     Medical: Not on file     Non-medical: Not on  "file   Tobacco Use     Smoking status: Never Smoker     Smokeless tobacco: Never Used   Substance and Sexual Activity     Alcohol use: Yes     Alcohol/week: 1.8 oz     Types: 3 Standard drinks or equivalent per week     Comment: occ     Drug use: No     Sexual activity: Yes     Partners: Male   Lifestyle     Physical activity:     Days per week: Not on file     Minutes per session: Not on file     Stress: Not on file   Relationships     Social connections:     Talks on phone: Not on file     Gets together: Not on file     Attends Holiness service: Not on file     Active member of club or organization: Not on file     Attends meetings of clubs or organizations: Not on file     Relationship status: Not on file     Intimate partner violence:     Fear of current or ex partner: Not on file     Emotionally abused: Not on file     Physically abused: Not on file     Forced sexual activity: Not on file   Other Topics Concern     Parent/sibling w/ CABG, MI or angioplasty before 65F 55M? No   Social History Narrative     Not on file       Family history:  Family History   Problem Relation Age of Onset     Diabetes Father      Heart Disease Father         CABG x 2 at 70     Thyroid Disease Father         graves disease     Hypertension Mother      Cancer Mother         breast cancer     Thyroid Disease Brother          EXAM:    Vitals: /72   Ht 1.727 m (5' 8\")   Wt 84.8 kg (187 lb)   BMI 28.43 kg/m    BMI: Body mass index is 28.43 kg/m .  Height: 5' 8\"    Constitutional/ general:  Pt is in no apparent distress, appears well-nourished.  Cooperative with history and physical exam.     Vascular:  Pedal pulses are palpable bilaterally for both the DP and PT arteries.  CFT < 3 sec.  No edema.  Pedal hair growth noted.     Neuro:  Alert and oriented x 3. Coordinated gait.  Light touch sensation is intact to the L4, L5, S1 distributions. No obvious deficits.  No evidence of neurological-based weakness, spasticity, or " contracture in the lower extremities.     Derm: Normal texture and turgor.  No erythema, ecchymosis, or cyanosis.  No open lesions.   Left 2nd toenail is painted.  It appears mildly thickened, in comparison to other nails.    Musculoskeletal:    Lower extremity muscle strength is normal.  Patient is ambulatory without an assistive device or brace .  Decrease in medial longitudinal arch with weight bearing. Flexible feet. Pain on palpation: none.  Hallux abducto valgus deformity, bilateral foot.  Reducible in the transverse plane with preserved sagittal plane ROM.  No crepitus.         Rashawn An DPM, FACFAS, MS    [facility]

## 2019-08-07 NOTE — LETTER
8/7/2019         RE: Marisela Beaver  [address] [place] [address]        Dear Colleague,    Thank you for referring your patient, Marisela Beaver, to the [facility]. Please see a copy of my visit note below.      ASSESSMENT/PLAN:    Encounter Diagnoses   Name Primary?     Valgus deformity of both great toes Yes     Metatarsalgia of right foot      Conservative cares were reviewed including improved foot support, proper shoes, anti-inflammatory measures, padding, and the avoidance of barefoot walking.  An informational handout on bunions provided.      Surgical intervention was also briefly discussed.      I explained how the right 2nd metatarsophalangeal joint pain is related to her bunion. We discussed metatarsal padding.   Rigid soled shoes were recommended to offload the forefoot during the propulsive phase of gait.     I explained that toenail changes are often from injury to a nail unit, rather than from fungus.  Injury might be a one-time event or from repetitive irritation in foot wear and with certain types of activities.  Injured nails are likely more susceptible to fungal infection.  Change seen in multiple nails is more likely from fungus.  Treatment options are limited.     It was explained that many people opt to simply keep the involved nails trimmed and filed. [facility] does not offer this service.  Another option is a trial of a topical and/or oral antifungal.  Many times these are not successful nor provide a cure.  These medications do not correct a nail deformity.      Permanent removal of deformed toenails is an option.        Body mass index is 28.43 kg/m .    Weight management plan: Patient was referred to their PCP to discuss a diet and exercise plan.      Rashawn An DPM, FACFAS, MS    [facility]      ____________________________________________________________________    HPI:         Chief Complaint:  bunion pain, both  Onset of problem: years  Pain/ discomfort is described as:  Deep ache  Ratin/10   Frequency:  intermittent    The pain is made worse with wearing sandals, flip flops  Previous treatment: ice, rest    She reports some pain on the plantar right foot near the 2nd metatarsophalangeal joint.  She also mentions fungus in her left 2nd toenail.   *  Patient Active Problem List   Diagnosis     Thyrotoxicosis     Iatrogenic pulmonary embolism and infarction (H)     Embolism and thrombosis (H)     Benign neoplasm of cerebral meninges (H)     PTSD (post-traumatic stress disorder)     Seizure disorder (H)     CARDIOVASCULAR SCREENING; LDL GOAL LESS THAN 160     Health Care Home     Bunion of great toe     Posterior tibial tendon dysfunction     Pes planus     Tibialis tendinitis     Ankle pain     Paroxysmal supraventricular tachycardia (H)   *  *  Past Surgical History:   Procedure Laterality Date     C EXCIS SUPRATENT MENINGIOMA      right frontal 8cm   *  *  Current Outpatient Medications   Medication Sig Dispense Refill     carBAMazepine (CARBATROL) 100 MG 12 hr capsule Take 3 capsules by mouth in the morning and 4 capsules  in the evening 630 capsule 11     IBUPROFEN PO Take  by mouth as needed.       verapamil (CALAN) 80 MG tablet Take 1 tablet (80 mg) by mouth 3 times daily as needed 5 tablet 3       ROS:     A 10-point review of systems was performed and is positive for that noted above in the HPI and as seen below.  All other areas are negative.     Numbness in feet?  no   Calf pain with walking? no  Recent foot/ankle injury? no  Weight change over past 12 months? 5# gain  Self perception as overweight? yes  Recent flu-like symptoms? no  Joint pain other than feet ? no    Social History:  Social History     Socioeconomic History     Marital status:      Spouse name: Not on file     Number of children: Not on file     Years of education: Not on file     Highest education level: Not on  "file   Occupational History     Not on file   Social Needs     Financial resource strain: Not on file     Food insecurity:     Worry: Not on file     Inability: Not on file     Transportation needs:     Medical: Not on file     Non-medical: Not on file   Tobacco Use     Smoking status: Never Smoker     Smokeless tobacco: Never Used   Substance and Sexual Activity     Alcohol use: Yes     Alcohol/week: 1.8 oz     Types: 3 Standard drinks or equivalent per week     Comment: occ     Drug use: No     Sexual activity: Yes     Partners: Male   Lifestyle     Physical activity:     Days per week: Not on file     Minutes per session: Not on file     Stress: Not on file   Relationships     Social connections:     Talks on phone: Not on file     Gets together: Not on file     Attends Yarsanism service: Not on file     Active member of club or organization: Not on file     Attends meetings of clubs or organizations: Not on file     Relationship status: Not on file     Intimate partner violence:     Fear of current or ex partner: Not on file     Emotionally abused: Not on file     Physically abused: Not on file     Forced sexual activity: Not on file   Other Topics Concern     Parent/sibling w/ CABG, MI or angioplasty before 65F 55M? No   Social History Narrative     Not on file       Family history:  Family History   Problem Relation Age of Onset     Diabetes Father      Heart Disease Father         CABG x 2 at 70     Thyroid Disease Father         graves disease     Hypertension Mother      Cancer Mother         breast cancer     Thyroid Disease Brother          EXAM:    Vitals: /72   Ht 1.727 m (5' 8\")   Wt 84.8 kg (187 lb)   BMI 28.43 kg/m     BMI: Body mass index is 28.43 kg/m .  Height: 5' 8\"    Constitutional/ general:  Pt is in no apparent distress, appears well-nourished.  Cooperative with history and physical exam.     Vascular:  Pedal pulses are palpable bilaterally for both the DP and PT arteries.  CFT < 3 " sec.  No edema.  Pedal hair growth noted.     Neuro:  Alert and oriented x 3. Coordinated gait.  Light touch sensation is intact to the L4, L5, S1 distributions. No obvious deficits.  No evidence of neurological-based weakness, spasticity, or contracture in the lower extremities.     Derm: Normal texture and turgor.  No erythema, ecchymosis, or cyanosis.  No open lesions.   Left 2nd toenail is painted.  It appears mildly thickened, in comparison to other nails.    Musculoskeletal:    Lower extremity muscle strength is normal.  Patient is ambulatory without an assistive device or brace .  Decrease in medial longitudinal arch with weight bearing. Flexible feet. Pain on palpation: none.  Hallux abducto valgus deformity, bilateral foot.  Reducible in the transverse plane with preserved sagittal plane ROM.  No crepitus.         Rashawn An DPM, FACFAS, MS    [facility]                Again, thank you for allowing me to participate in the care of your patient.        Sincerely,        Rashawn An DPM

## 2019-08-16 ENCOUNTER — DOCUMENTATION ONLY (OUTPATIENT)
Dept: CARE COORDINATION | Facility: CLINIC | Age: 54
End: 2019-08-16

## 2019-09-16 ENCOUNTER — OFFICE VISIT (OUTPATIENT)
Dept: NEUROLOGY | Facility: CLINIC | Age: 54
End: 2019-09-16
Payer: COMMERCIAL

## 2019-09-16 VITALS
OXYGEN SATURATION: 99 % | DIASTOLIC BLOOD PRESSURE: 87 MMHG | HEART RATE: 63 BPM | RESPIRATION RATE: 16 BRPM | SYSTOLIC BLOOD PRESSURE: 134 MMHG | WEIGHT: 186 LBS | BODY MASS INDEX: 28.19 KG/M2 | HEIGHT: 68 IN

## 2019-09-16 DIAGNOSIS — G40.309 GENERALIZED CONVULSIVE EPILEPSY (H): ICD-10-CM

## 2019-09-16 LAB — CARBAMAZEPINE SERPL-MCNC: 9.3 MG/L (ref 4–12)

## 2019-09-16 RX ORDER — CARBAMAZEPINE 100 MG/1
CAPSULE, EXTENDED RELEASE ORAL
Qty: 630 CAPSULE | Refills: 11 | Status: SHIPPED | OUTPATIENT
Start: 2019-09-16 | End: 2020-09-10

## 2019-09-16 ASSESSMENT — MIFFLIN-ST. JEOR: SCORE: 1492.19

## 2019-09-16 ASSESSMENT — PAIN SCALES - GENERAL: PAINLEVEL: NO PAIN (0)

## 2019-09-16 NOTE — NURSING NOTE
Chief Complaint   Patient presents with     Seizures     [facility] RETURN SEIZURE ANNUAL F/U       Calvin Perera, EMT

## 2019-09-16 NOTE — PROGRESS NOTES
Answers for HPI/ROS submitted by the patient on 9/15/2019   General Symptoms: No  Skin Symptoms: No  HENT Symptoms: No  EYE SYMPTOMS: No  HEART SYMPTOMS: No  LUNG SYMPTOMS: No  INTESTINAL SYMPTOMS: No  URINARY SYMPTOMS: No  GYNECOLOGIC SYMPTOMS: No  BREAST SYMPTOMS: No  SKELETAL SYMPTOMS: No  BLOOD SYMPTOMS: No  NERVOUS SYSTEM SYMPTOMS: No  MENTAL HEALTH SYMPTOMS: No

## 2019-09-16 NOTE — PROGRESS NOTES
Service Date: 2019      Madison Cabral MD   [facility]    [address]   [place] [address]      RE: Marisela Beaver    MRN: [id number]   : 1965      Dear Dr. Cabral:      Ms. Marisela Beaver is a 54-year-old woman who was seen in the Neurology Clinic for her yearly followup after resection of a right frontal meningioma.  We follow her for seizures.  Her last MRI was in 2017, which showed no evidence of recurrence.  She has significant frontal lobe encephalomalacia associated with extended slightly to the left frontal region.  She said at the time of diagnosis she had a personality change.  She says she has not experienced that same after surgery.  She has been maintained on medication for her seizures and her last EEG few years ago did show some evidence of possible epileptiform activity in the right frontal lobe.  She has been receiving Carbatrol 100 mg 3 in the morning and 4 in the evening and her last concentration last year was 8.0.  In the last year since seen, she has not had any auras, seizures, headaches or any changes.  She has some medical issues and these are followed by you and apparently there is nothing outstanding has happened.  She does have a history of paroxysmal supraventricular tachycardia and may undergo ablation with that.  This started in her eyes in her teenage or her high school years.  She has been working and doing well.        ALLERGIES:   She has no known allergies.        REVIEW OF SYSTEMS:  Unremarkable.      PHYSICAL EXAMINATION:  On exam, she is a very pleasant woman.  She has a blood pressure 134/87.  Her weight is 186 pounds and stable.  Pulse is 63.  Scar is well healed.  Fundus is normal.  Normal eye movements.  Normal coordination, reflexes and so forth.      In summary, she is seizure-free.  She is status right frontal meningioma resection with significant encephalomalacia sternum partly to the left frontal tip.  We will repeat EEG  as it has been a few years and there was some activity the last time suspicious for seizures.  MRI does not need to be repeated until next year.  Her exam is stable.  We will keep her on the same dose of Carbatrol and check her concentration today.      Sincerely,       MD EDSON Sears MD             D: 2019   T: 2019   MT: AKA      Name:     HEMANTH LPOEZ   MRN:      [id number]        Account:      [id number]   :      1965           Service Date: 2019      Document: [id number]

## 2019-09-16 NOTE — LETTER
RE: Marisela Beaver  [address] [place] [address]     Dear Colleague,    Thank you for referring your patient, Marisela Beaver, to the [facility] NEUROLOGY at [facility]. Please see a copy of my visit note below.    Service Date: 2019      Madison Cabral MD   [facility]    [address]   [place] [address]      RE: Marisela Beaver    MRN: [id number]   : 1965      Dear Dr. Cabral:      Ms. Marisela Beaver is a 54-year-old woman who was seen in the Neurology Clinic for her yearly followup after resection of a right frontal meningioma.  We follow her for seizures.  Her last MRI was in 2017, which showed no evidence of recurrence.  She has significant frontal lobe encephalomalacia associated with extended slightly to the left frontal region.  She said at the time of diagnosis she had a personality change.  She says she has not experienced that same after surgery.  She has been maintained on medication for her seizures and her last EEG few years ago did show some evidence of possible epileptiform activity in the right frontal lobe.  She has been receiving Carbatrol 100 mg 3 in the morning and 4 in the evening and her last concentration last year was 8.0.  In the last year since seen, she has not had any auras, seizures, headaches or any changes.  She has some medical issues and these are followed by you and apparently there is nothing outstanding has happened.  She does have a history of paroxysmal supraventricular tachycardia and may undergo ablation with that.  This started in her eyes in her teenage or her high school years.  She has been working and doing well.        ALLERGIES:   She has no known allergies.        REVIEW OF SYSTEMS:  Unremarkable.      PHYSICAL EXAMINATION:  On exam, she is a very pleasant woman.  She has a blood pressure 134/87.  Her weight is 186 pounds and stable.  Pulse is 63.  Scar is  well healed.  Fundus is normal.  Normal eye movements.  Normal coordination, reflexes and so forth.      In summary, she is seizure-free.  She is status right frontal meningioma resection with significant encephalomalacia sternum partly to the left frontal tip.  We will repeat EEG as it has been a few years and there was some activity the last time suspicious for seizures.  MRI does not need to be repeated until next year.  Her exam is stable.  We will keep her on the same dose of Carbatrol and check her concentration today.      Sincerely,       Jeremías De Oliveira MD

## 2019-10-02 ENCOUNTER — HEALTH MAINTENANCE LETTER (OUTPATIENT)
Age: 54
End: 2019-10-02

## 2019-11-22 ENCOUNTER — TRANSFERRED RECORDS (OUTPATIENT)
Dept: HEALTH INFORMATION MANAGEMENT | Facility: CLINIC | Age: 54
End: 2019-11-22

## 2020-01-21 DIAGNOSIS — G40.309 GENERALIZED CONVULSIVE EPILEPSY (H): Primary | ICD-10-CM

## 2020-02-04 ENCOUNTER — ALLIED HEALTH/NURSE VISIT (OUTPATIENT)
Dept: NEUROLOGY | Facility: CLINIC | Age: 55
End: 2020-02-04
Payer: COMMERCIAL

## 2020-02-04 DIAGNOSIS — G40.309 GENERALIZED CONVULSIVE EPILEPSY (H): Primary | ICD-10-CM

## 2020-02-04 NOTE — PROCEDURES
EDATE OF STUDY:  2020      TYPE OF STUDY:  Routine outpatient EEG      EEG #:       HISTORY:  A 54-year-old woman with a history of epileptic seizures who is status post resection of a right frontal meningioma.  Previous EEGs have reportedly shown right frontal epileptiform discharges.  She is now undergoing reevaluation of her seizures.  She is being treated with carbamazepine 700 mg per day.      FINDINGS:  Desynchronized background during much of waking recording.  Occasional runs of 9-10 Hz posterior dominant rhythm.  No clear focal slowing is appreciated.  With sleep, vertex waves are seen.  Initially there are serial arousals and positive occipital sharp transients of sleep.  Eventually, brief periods of N2 sleep with sleep spindles are also noted.      Photic stimulation does not induce any abnormalities.  Hyperventilation is not performed.      OTHER INTERICTAL ABNORMALITIES:  No epileptiform discharges.      ICTAL ABNORMALITIES:  No electrographic seizures.      IMPRESSION:  Normal in wakefulness and sleep.  No epileptiform discharges or seizures.      Jaspreet Camarena MD        D: 2020   T: 2020   MT: AKA      Name:     HEMANTH LOPEZ   MRN:      [id number]        Account:        [id number]   :      1965           Procedure Date: 2020      Document: [id number]

## 2020-02-04 NOTE — PROGRESS NOTES
OP Routine EEG  (spontaneous sleep obtained [facility] [address] [address] Camarena

## 2020-02-12 ENCOUNTER — OFFICE VISIT (OUTPATIENT)
Dept: INTERNAL MEDICINE | Facility: CLINIC | Age: 55
End: 2020-02-12
Payer: COMMERCIAL

## 2020-02-12 VITALS
OXYGEN SATURATION: 99 % | RESPIRATION RATE: 16 BRPM | BODY MASS INDEX: 28.28 KG/M2 | HEIGHT: 68 IN | TEMPERATURE: 97.4 F | SYSTOLIC BLOOD PRESSURE: 128 MMHG | DIASTOLIC BLOOD PRESSURE: 78 MMHG | HEART RATE: 64 BPM

## 2020-02-12 DIAGNOSIS — I47.10 PAROXYSMAL SUPRAVENTRICULAR TACHYCARDIA (H): ICD-10-CM

## 2020-02-12 DIAGNOSIS — Z86.718 PERSONAL HISTORY OF DVT (DEEP VEIN THROMBOSIS): ICD-10-CM

## 2020-02-12 DIAGNOSIS — Z00.00 PREVENTATIVE HEALTH CARE: Primary | ICD-10-CM

## 2020-02-12 DIAGNOSIS — Z87.59 HISTORY OF MATERNAL PULMONARY EMBOLUS: ICD-10-CM

## 2020-02-12 DIAGNOSIS — Z86.711 HISTORY OF MATERNAL PULMONARY EMBOLUS: ICD-10-CM

## 2020-02-12 DIAGNOSIS — G40.909 SEIZURE DISORDER (H): ICD-10-CM

## 2020-02-12 PROBLEM — Z86.018 HISTORY OF DYSPLASTIC NEVUS: Status: ACTIVE | Noted: 2018-05-29

## 2020-02-12 LAB
ALBUMIN SERPL-MCNC: 3.9 G/DL (ref 3.4–5)
ALP SERPL-CCNC: 112 U/L (ref 40–150)
ALT SERPL W P-5'-P-CCNC: 28 U/L (ref 0–50)
ANION GAP SERPL CALCULATED.3IONS-SCNC: 6 MMOL/L (ref 3–14)
AST SERPL W P-5'-P-CCNC: 19 U/L (ref 0–45)
BILIRUB SERPL-MCNC: 0.4 MG/DL (ref 0.2–1.3)
BUN SERPL-MCNC: 14 MG/DL (ref 7–30)
CALCIUM SERPL-MCNC: 9.3 MG/DL (ref 8.5–10.1)
CHLORIDE SERPL-SCNC: 105 MMOL/L (ref 94–109)
CHOLEST SERPL-MCNC: 237 MG/DL
CO2 SERPL-SCNC: 27 MMOL/L (ref 20–32)
CREAT SERPL-MCNC: 0.72 MG/DL (ref 0.52–1.04)
ERYTHROCYTE [DISTWIDTH] IN BLOOD BY AUTOMATED COUNT: 11.9 % (ref 10–15)
GFR SERPL CREATININE-BSD FRML MDRD: >90 ML/MIN/{1.73_M2}
GLUCOSE SERPL-MCNC: 109 MG/DL (ref 70–99)
HCT VFR BLD AUTO: 40.1 % (ref 35–47)
HDLC SERPL-MCNC: 84 MG/DL
HGB BLD-MCNC: 13.2 G/DL (ref 11.7–15.7)
LDLC SERPL CALC-MCNC: 141 MG/DL
MCH RBC QN AUTO: 31.7 PG (ref 26.5–33)
MCHC RBC AUTO-ENTMCNC: 32.9 G/DL (ref 31.5–36.5)
MCV RBC AUTO: 96 FL (ref 78–100)
NONHDLC SERPL-MCNC: 153 MG/DL
PLATELET # BLD AUTO: 177 10E9/L (ref 150–450)
POTASSIUM SERPL-SCNC: 4 MMOL/L (ref 3.4–5.3)
PROT SERPL-MCNC: 8 G/DL (ref 6.8–8.8)
RBC # BLD AUTO: 4.17 10E12/L (ref 3.8–5.2)
SODIUM SERPL-SCNC: 138 MMOL/L (ref 133–144)
TRIGL SERPL-MCNC: 58 MG/DL
TSH SERPL DL<=0.005 MIU/L-ACNC: 1.48 MU/L (ref 0.4–4)
WBC # BLD AUTO: 3.8 10E9/L (ref 4–11)

## 2020-02-12 PROCEDURE — 99386 PREV VISIT NEW AGE 40-64: CPT | Performed by: INTERNAL MEDICINE

## 2020-02-12 PROCEDURE — 80053 COMPREHEN METABOLIC PANEL: CPT | Performed by: INTERNAL MEDICINE

## 2020-02-12 PROCEDURE — 80061 LIPID PANEL: CPT | Performed by: INTERNAL MEDICINE

## 2020-02-12 PROCEDURE — 84443 ASSAY THYROID STIM HORMONE: CPT | Performed by: INTERNAL MEDICINE

## 2020-02-12 PROCEDURE — 85027 COMPLETE CBC AUTOMATED: CPT | Performed by: INTERNAL MEDICINE

## 2020-02-12 PROCEDURE — 36415 COLL VENOUS BLD VENIPUNCTURE: CPT | Performed by: INTERNAL MEDICINE

## 2020-02-12 NOTE — PROGRESS NOTES
SUBJECTIVE:   CC: Marisela Beaver is an 54 year old woman who presents for preventive health visit.     Fasting.    Healthy Habits:    Do you get at least three servings of calcium containing foods daily (dairy, green leafy vegetables, etc.)? no    Amount of exercise or daily activities, outside of work: 3 day(s) per week    Problems taking medications regularly No    Medication side effects: No    Have you had an eye exam in the past two years? yes    Do you see a dentist twice per year? yes    Do you have sleep apnea, excessive snoring or daytime drowsiness?no      Today's PHQ-2 Score:   PHQ-2 ( 1999 [employer]) 9/16/2019 9/10/2018   Q1: Little interest or pleasure in doing things 0 0   Q2: Feeling down, depressed or hopeless 0 0   PHQ-2 Score 0 0   Q1: Little interest or pleasure in doing things - Not at all   Q2: Feeling down, depressed or hopeless - Not at all   PHQ-2 Score - 0       Abuse: Current or Past(Physical, Sexual or Emotional)- No  Do you feel safe in your environment? Yes        Social History     Tobacco Use     Smoking status: Never Smoker     Smokeless tobacco: Never Used   Substance Use Topics     Alcohol use: Yes     Alcohol/week: 3.0 standard drinks     Types: 3 Standard drinks or equivalent per week     Comment: occ     If you drink alcohol do you typically have >3 drinks per day or >7 drinks per week? No                     Reviewed orders with patient.  Reviewed health maintenance and updated orders accordingly - Yes  BP Readings from Last 3 Encounters:   02/12/20 128/78   09/16/19 134/87   08/07/19 118/72    Wt Readings from Last 3 Encounters:   09/16/19 84.4 kg (186 lb)   08/07/19 84.8 kg (187 lb)   09/10/18 83.9 kg (185 lb)                    Mammogram Screening: Patient over age 50, mutual decision to screen reflected in health maintenance.    Pertinent mammograms are reviewed under the imaging tab.  History of abnormal Pap smear: NO - age 30-65 PAP every 5 years with negative HPV  "co-testing recommended  PAP / HPV 3/14/2014   PAP normal     Reviewed and updated as needed this visit by clinical staff  Tobacco  Allergies         Reviewed and updated as needed this visit by Provider            ROS:  CONSTITUTIONAL: NEGATIVE for fever, chills, change in weight  INTEGUMENTARY/SKIN: NEGATIVE for worrisome rashes, moles or lesions  EYES: NEGATIVE for vision changes or irritation  ENT: NEGATIVE for ear, mouth and throat problems  RESP: NEGATIVE for significant cough or SOB  CV: NEGATIVE for chest pain, palpitations or peripheral edema  GI: NEGATIVE for nausea, abdominal pain, heartburn, or change in bowel habits  : NEGATIVE for unusual urinary or vaginal symptoms. No vaginal bleeding.  MUSCULOSKELETAL: NEGATIVE for significant arthralgias or myalgia  NEURO: NEGATIVE for weakness, dizziness or paresthesias  PSYCHIATRIC: NEGATIVE for changes in mood or affect     OBJECTIVE:   /88 (BP Location: Right arm, Patient Position: Chair, Cuff Size: Adult Large)   Pulse 64   Temp 97.4  F (36.3  C) (Oral)   Resp 16   Ht 1.727 m (5' 8\")   SpO2 99%   Breastfeeding No   BMI 28.28 kg/m    EXAM:  GENERAL: healthy, alert and no distress  EYES: Eyes grossly normal to inspection, PERRL and conjunctivae and sclerae normal  HENT: ear canals and TM's normal, nose and mouth without ulcers or lesions  NECK: no adenopathy, no asymmetry, masses, or scars and thyroid normal to palpation  RESP: lungs clear to auscultation - no rales, rhonchi or wheezes  CV: regular rate and rhythm, normal S1 S2, no S3 or S4, no murmur, click or rub, no peripheral edema and peripheral pulses strong  ABDOMEN: soft, nontender, no hepatosplenomegaly, no masses and bowel sounds normal  MS: no gross musculoskeletal defects noted, no edema  SKIN: no suspicious lesions or rashes  NEURO: Normal strength and tone, mentation intact and speech normal  PSYCH: mentation appears normal, affect normal/bright      ASSESSMENT/PLAN:   1. " "Preventative health care     - Comprehensive metabolic panel  - TSH with free T4 reflex  - CBC with platelets  - Lipid Profile (Chol, Trig, HDL, LDL calc)    2. History of maternal pulmonary embolus  Off all anticoagulants, no sign recurrence    3. Personal history of DVT (deep vein thrombosis)  as above.     4. Seizure disorder (H)  under good control     5. Paroxysmal supraventricular tachycardia (H)  under good control Continue current medications.       COUNSELING:   Reviewed preventive health counseling, as reflected in patient instructions       Regular exercise       Healthy diet/nutrition    Estimated body mass index is 28.28 kg/m  as calculated from the following:    Height as of this encounter: 1.727 m (5' 8\").    Weight as of 9/16/19: 84.4 kg (186 lb).    Weight management plan: Discussed healthy diet and exercise guidelines     reports that she has never smoked. She has never used smokeless tobacco.      Counseling Resources:  ATP IV Guidelines  Pooled Cohorts Equation Calculator  Breast Cancer Risk Calculator  FRAX Risk Assessment  ICSI Preventive Guidelines  Dietary Guidelines for Americans, 2010  USDA's MyPlate  ASA Prophylaxis  Lung CA Screening    Madison Cabral MD  [facility]  "

## 2020-02-18 ENCOUNTER — TELEPHONE (OUTPATIENT)
Dept: INTERNAL MEDICINE | Facility: CLINIC | Age: 55
End: 2020-02-18

## 2020-02-18 NOTE — TELEPHONE ENCOUNTER
Panel Management Review      Patient has the following on her problem list: None      Composite cancer screening  Chart review shows that this patient is due/due soon for the following Mammogram  Summary:    Patient is due/failing the following:   MAMMOGRAM    Action needed:   Patient needs referral/order: ordered at 2- appointment.    Type of outreach:    None needed.     Questions for provider review:    None                                                                                                                                    CORA Almaguer LPN       Chart routed to none.

## 2020-02-24 ENCOUNTER — MYC MEDICAL ADVICE (OUTPATIENT)
Dept: NEUROLOGY | Facility: CLINIC | Age: 55
End: 2020-02-24

## 2020-06-17 ENCOUNTER — TRANSFERRED RECORDS (OUTPATIENT)
Dept: HEALTH INFORMATION MANAGEMENT | Facility: CLINIC | Age: 55
End: 2020-06-17

## 2020-06-17 LAB
HPV ABSTRACT: NORMAL
PAP-ABSTRACT: NORMAL

## 2020-11-30 DIAGNOSIS — G40.309 GENERALIZED CONVULSIVE EPILEPSY (H): ICD-10-CM

## 2020-11-30 PROCEDURE — 36415 COLL VENOUS BLD VENIPUNCTURE: CPT | Performed by: PSYCHIATRY & NEUROLOGY

## 2020-11-30 PROCEDURE — 80156 ASSAY CARBAMAZEPINE TOTAL: CPT | Performed by: PSYCHIATRY & NEUROLOGY

## 2020-12-01 LAB — CARBAMAZEPINE SERPL-MCNC: 9.7 MG/L (ref 4–12)

## 2021-01-15 ENCOUNTER — HEALTH MAINTENANCE LETTER (OUTPATIENT)
Age: 56
End: 2021-01-15

## 2021-02-22 ENCOUNTER — TRANSFERRED RECORDS (OUTPATIENT)
Dept: HEALTH INFORMATION MANAGEMENT | Facility: CLINIC | Age: 56
End: 2021-02-22

## 2021-03-21 ENCOUNTER — HEALTH MAINTENANCE LETTER (OUTPATIENT)
Age: 56
End: 2021-03-21

## 2021-04-10 ASSESSMENT — ENCOUNTER SYMPTOMS
HEADACHES: 0
JOINT SWELLING: 0
ARTHRALGIAS: 0
PALPITATIONS: 0
FEVER: 0
BREAST MASS: 0
WEAKNESS: 0
SORE THROAT: 0
CHILLS: 0
DIZZINESS: 0
NERVOUS/ANXIOUS: 0
HEMATURIA: 0
DIARRHEA: 0
SHORTNESS OF BREATH: 0
HEARTBURN: 0
DYSURIA: 0
COUGH: 0
CONSTIPATION: 0
NAUSEA: 0
HEMATOCHEZIA: 0
PARESTHESIAS: 0
ABDOMINAL PAIN: 0
EYE PAIN: 0
FREQUENCY: 0
MYALGIAS: 0

## 2021-04-14 ENCOUNTER — OFFICE VISIT (OUTPATIENT)
Dept: INTERNAL MEDICINE | Facility: CLINIC | Age: 56
End: 2021-04-14
Payer: COMMERCIAL

## 2021-04-14 VITALS
HEIGHT: 68 IN | BODY MASS INDEX: 28.04 KG/M2 | TEMPERATURE: 97.5 F | HEART RATE: 77 BPM | OXYGEN SATURATION: 99 % | SYSTOLIC BLOOD PRESSURE: 135 MMHG | WEIGHT: 185 LBS | RESPIRATION RATE: 16 BRPM | DIASTOLIC BLOOD PRESSURE: 89 MMHG

## 2021-04-14 DIAGNOSIS — Z00.00 PREVENTATIVE HEALTH CARE: Primary | ICD-10-CM

## 2021-04-14 LAB
ALBUMIN SERPL-MCNC: 3.8 G/DL (ref 3.4–5)
ALP SERPL-CCNC: 117 U/L (ref 40–150)
ALT SERPL W P-5'-P-CCNC: 28 U/L (ref 0–50)
ANION GAP SERPL CALCULATED.3IONS-SCNC: 4 MMOL/L (ref 3–14)
AST SERPL W P-5'-P-CCNC: 18 U/L (ref 0–45)
BASOPHILS # BLD AUTO: 0 10E9/L (ref 0–0.2)
BASOPHILS NFR BLD AUTO: 0.2 %
BILIRUB SERPL-MCNC: 0.3 MG/DL (ref 0.2–1.3)
BUN SERPL-MCNC: 16 MG/DL (ref 7–30)
CALCIUM SERPL-MCNC: 9.1 MG/DL (ref 8.5–10.1)
CHLORIDE SERPL-SCNC: 107 MMOL/L (ref 94–109)
CHOLEST SERPL-MCNC: 236 MG/DL
CO2 SERPL-SCNC: 28 MMOL/L (ref 20–32)
CREAT SERPL-MCNC: 0.75 MG/DL (ref 0.52–1.04)
DIFFERENTIAL METHOD BLD: NORMAL
EOSINOPHIL # BLD AUTO: 0.1 10E9/L (ref 0–0.7)
EOSINOPHIL NFR BLD AUTO: 1.6 %
ERYTHROCYTE [DISTWIDTH] IN BLOOD BY AUTOMATED COUNT: 11.7 % (ref 10–15)
GFR SERPL CREATININE-BSD FRML MDRD: 89 ML/MIN/{1.73_M2}
GLUCOSE SERPL-MCNC: 102 MG/DL (ref 70–99)
HCT VFR BLD AUTO: 38.6 % (ref 35–47)
HDLC SERPL-MCNC: 80 MG/DL
HGB BLD-MCNC: 13.3 G/DL (ref 11.7–15.7)
LDLC SERPL CALC-MCNC: 141 MG/DL
LYMPHOCYTES # BLD AUTO: 1.2 10E9/L (ref 0.8–5.3)
LYMPHOCYTES NFR BLD AUTO: 28.5 %
MCH RBC QN AUTO: 32.8 PG (ref 26.5–33)
MCHC RBC AUTO-ENTMCNC: 34.5 G/DL (ref 31.5–36.5)
MCV RBC AUTO: 95 FL (ref 78–100)
MONOCYTES # BLD AUTO: 0.5 10E9/L (ref 0–1.3)
MONOCYTES NFR BLD AUTO: 10.8 %
NEUTROPHILS # BLD AUTO: 2.6 10E9/L (ref 1.6–8.3)
NEUTROPHILS NFR BLD AUTO: 58.9 %
NONHDLC SERPL-MCNC: 156 MG/DL
PLATELET # BLD AUTO: 161 10E9/L (ref 150–450)
POTASSIUM SERPL-SCNC: 4.1 MMOL/L (ref 3.4–5.3)
PROT SERPL-MCNC: 7.8 G/DL (ref 6.8–8.8)
RBC # BLD AUTO: 4.05 10E12/L (ref 3.8–5.2)
SODIUM SERPL-SCNC: 139 MMOL/L (ref 133–144)
TRIGL SERPL-MCNC: 76 MG/DL
TSH SERPL DL<=0.005 MIU/L-ACNC: 1.21 MU/L (ref 0.4–4)
WBC # BLD AUTO: 4.4 10E9/L (ref 4–11)

## 2021-04-14 PROCEDURE — 80050 GENERAL HEALTH PANEL: CPT | Performed by: INTERNAL MEDICINE

## 2021-04-14 PROCEDURE — 99396 PREV VISIT EST AGE 40-64: CPT | Performed by: INTERNAL MEDICINE

## 2021-04-14 PROCEDURE — 80061 LIPID PANEL: CPT | Performed by: INTERNAL MEDICINE

## 2021-04-14 PROCEDURE — 36415 COLL VENOUS BLD VENIPUNCTURE: CPT | Performed by: INTERNAL MEDICINE

## 2021-04-14 ASSESSMENT — ENCOUNTER SYMPTOMS
BREAST MASS: 0
SORE THROAT: 0
FREQUENCY: 0
FEVER: 0
NERVOUS/ANXIOUS: 0
SHORTNESS OF BREATH: 0
PALPITATIONS: 0
DIARRHEA: 0
DIZZINESS: 0
HEADACHES: 0
CHILLS: 0
HEMATOCHEZIA: 0
NAUSEA: 0
CONSTIPATION: 0
PARESTHESIAS: 0
MYALGIAS: 0
JOINT SWELLING: 0
ABDOMINAL PAIN: 0
EYE PAIN: 0
HEMATURIA: 0
ARTHRALGIAS: 0
COUGH: 0
DYSURIA: 0
HEARTBURN: 0
WEAKNESS: 0

## 2021-04-14 ASSESSMENT — MIFFLIN-ST. JEOR: SCORE: 1478.68

## 2021-04-14 NOTE — PROGRESS NOTES
SUBJECTIVE:   CC: Marisela Beaver is an 55 year old woman who presents for preventive health visit.     Fasting. (Has pap with GYN.)    Patient has been advised of split billing requirements and indicates understanding: Yes     Healthy Habits:     Getting at least 3 servings of Calcium per day:  Yes    Bi-annual eye exam:  Yes    Dental care twice a year:  Yes    Sleep apnea or symptoms of sleep apnea:  None    Diet:  Regular (no restrictions)    Frequency of exercise:  4-5 days/week    Duration of exercise:  45-60 minutes    Taking medications regularly:  Yes    Medication side effects:  None    PHQ-2 Total Score: 1    Additional concerns today:  Yes      Today's PHQ-2 Score:   PHQ-2 ( 1999 Pfizer) 4/10/2021   Q1: Little interest or pleasure in doing things 0   Q2: Feeling down, depressed or hopeless 1   PHQ-2 Score 1   Q1: Little interest or pleasure in doing things Not at all   Q2: Feeling down, depressed or hopeless Several days   PHQ-2 Score 1       Abuse: Current or Past (Physical, Sexual or Emotional) - No  Do you feel safe in your environment? Yes    Have you ever done Advance Care Planning? (For example, a Health Directive, POLST, or a discussion with a medical provider or your loved ones about your wishes): Yes, patient states has an Advance Care Planning document and will bring a copy to the clinic.    Social History     Tobacco Use     Smoking status: Never Smoker     Smokeless tobacco: Never Used   Substance Use Topics     Alcohol use: Yes     Alcohol/week: 3.0 standard drinks     Types: 3 Standard drinks or equivalent per week     Comment: occ     If you drink alcohol do you typically have >3 drinks per day or >7 drinks per week? No    Alcohol Use 4/10/2021   Prescreen: >3 drinks/day or >7 drinks/week? No       Reviewed orders with patient.  Reviewed health maintenance and updated orders accordingly - Yes  BP Readings from Last 3 Encounters:   04/14/21 135/89   02/12/20 128/78   09/16/19 134/87     Wt Readings from Last 3 Encounters:   04/14/21 83.9 kg (185 lb)   09/16/19 84.4 kg (186 lb)   08/07/19 84.8 kg (187 lb)                    Breast Cancer Screening:  Any new diagnosis of family breast, ovarian, or bowel cancer? No    FSH-7: No flowsheet data found.    Mammogram Screening: Recommended mammography every 1-2 years with patient discussion and risk factor consideration  Pertinent mammograms are reviewed under the imaging tab.    History of abnormal Pap smear: NO - age 30-65 PAP every 5 years with negative HPV co-testing recommended  PAP / HPV 3/14/2014   PAP normal     Reviewed and updated as needed this visit by clinical staff                 Reviewed and updated as needed this visit by Provider                    Review of Systems   Constitutional: Negative for chills and fever.   HENT: Negative for congestion, ear pain, hearing loss and sore throat.    Eyes: Negative for pain and visual disturbance.   Respiratory: Negative for cough and shortness of breath.    Cardiovascular: Negative for chest pain, palpitations and peripheral edema.   Gastrointestinal: Negative for abdominal pain, constipation, diarrhea, heartburn, hematochezia and nausea.   Breasts:  Negative for tenderness, breast mass and discharge.   Genitourinary: Negative for dysuria, frequency, genital sores, hematuria, pelvic pain, urgency, vaginal bleeding and vaginal discharge.   Musculoskeletal: Negative for arthralgias, joint swelling and myalgias.   Skin: Negative for rash.   Neurological: Negative for dizziness, weakness, headaches and paresthesias.   Psychiatric/Behavioral: Negative for mood changes. The patient is not nervous/anxious.          OBJECTIVE:   There were no vitals taken for this visit.  Physical Exam  GENERAL: healthy, alert and no distress  EYES: Eyes grossly normal to inspection, PERRL and conjunctivae and sclerae normal  NECK: no adenopathy, no asymmetry, masses, or scars and thyroid normal to palpation  RESP: lungs  "clear to auscultation - no rales, rhonchi or wheezes  CV: regular rate and rhythm, normal S1 S2, no S3 or S4, no murmur, click or rub, no peripheral edema and peripheral pulses strong  ABDOMEN: soft, nontender, no hepatosplenomegaly, no masses and bowel sounds normal  MS: no gross musculoskeletal defects noted, no edema  SKIN: no suspicious lesions or rashes  NEURO: Normal strength and tone, mentation intact and speech normal  PSYCH: mentation appears normal, affect normal/bright      ASSESSMENT/PLAN:   1. Preventative health care    - CBC with platelets and differential  - Comprehensive metabolic panel (BMP + Alb, Alk Phos, ALT, AST, Total. Bili, TP)  - TSH with free T4 reflex  - Lipid Profile (Chol, Trig, HDL, LDL calc)    Patient has been advised of split billing requirements and indicates understanding: Yes  COUNSELING:       Regular exercise       Healthy diet/nutrition    Estimated body mass index is 28.28 kg/m  as calculated from the following:    Height as of 2/12/20: 1.727 m (5' 8\").    Weight as of 9/16/19: 84.4 kg (186 lb).    Weight management plan: Discussed healthy diet and exercise guidelines    She reports that she has never smoked. She has never used smokeless tobacco.      Counseling Resources:  ATP IV Guidelines  Pooled Cohorts Equation Calculator  Breast Cancer Risk Calculator  BRCA-Related Cancer Risk Assessment: FHS-7 Tool  FRAX Risk Assessment  ICSI Preventive Guidelines  Dietary Guidelines for Americans, 2010  USDA's MyPlate  ASA Prophylaxis  Lung CA Screening    Madison Cabral MD  [facility]  "

## 2021-06-17 ENCOUNTER — TRANSFERRED RECORDS (OUTPATIENT)
Dept: HEALTH INFORMATION MANAGEMENT | Facility: CLINIC | Age: 56
End: 2021-06-17

## 2021-06-29 ENCOUNTER — TELEPHONE (OUTPATIENT)
Dept: NEUROLOGY | Facility: CLINIC | Age: 56
End: 2021-06-29

## 2021-07-06 ENCOUNTER — TELEPHONE (OUTPATIENT)
Dept: NEUROLOGY | Facility: CLINIC | Age: 56
End: 2021-07-06

## 2021-07-06 NOTE — TELEPHONE ENCOUNTER
DMV Form received from patient via Fax.  DMV Form Completed and will be given to MD for signature on 6/8/21.

## 2021-07-08 ENCOUNTER — TELEPHONE (OUTPATIENT)
Dept: NEUROLOGY | Facility: CLINIC | Age: 56
End: 2021-07-08

## 2021-07-08 NOTE — TELEPHONE ENCOUNTER
DMV Form received from patient via fax.  DMV Form Completed and given to MD for signature.  DMV Form signed by MD, faxed to [place] Dept of Public Safety, Copy sent to be scanned into EHR, and copy sent to patient at address on file.  Phone call made to patient to inform them.      Alhaji Canela RN

## 2021-08-12 ENCOUNTER — TRANSFERRED RECORDS (OUTPATIENT)
Dept: HEALTH INFORMATION MANAGEMENT | Facility: CLINIC | Age: 56
End: 2021-08-12

## 2021-08-25 NOTE — PROGRESS NOTES
"   SUBJECTIVE:   CC: Marisela Beaver is an 54 year old woman who presents for preventive health visit.     HPI  {Add if <65 person on Medicare  - Required Questions (Optional):887109}  {Outside tests to abstract? :980704}    {additional problems to add (Optional):137815}    Today's PHQ-2 Score:   PHQ-2 ( 1999 Pfizer) 9/16/2019   Q1: Little interest or pleasure in doing things 0   Q2: Feeling down, depressed or hopeless 0   PHQ-2 Score 0   Q1: Little interest or pleasure in doing things -   Q2: Feeling down, depressed or hopeless -   PHQ-2 Score -       Abuse: Current or Past(Physical, Sexual or Emotional)- { :211230}  Do you feel safe in your environment? { :876069}        Social History     Tobacco Use     Smoking status: Never Smoker     Smokeless tobacco: Never Used   Substance Use Topics     Alcohol use: Yes     Alcohol/week: 3.0 standard drinks     Types: 3 Standard drinks or equivalent per week     Comment: occ     {Rooming Staff- Complete this question if Prescreen response is not shown below for today's visit. If you drink alcohol do you typically have >3 drinks per day or >7 drinks per week? (Optional):889527}    No flowsheet data found.{add AUDIT responses (Optional) (A score of 7 for adult men is an indication of hazardous drinking; a score of 8 or more is an indication of an alcohol use disorder.  A score of 7 or more for adult women is an indication of hazardous drinking or an alchohol use disorder):920226}    Reviewed orders with patient.  Reviewed health maintenance and updated orders accordingly - { :402408::\"Yes\"}  {Chronicprobdata (optional):339286}    {Mammo Decision Support (Optional):402214}    Pertinent mammograms are reviewed under the imaging tab.  History of abnormal Pap smear: { :837172}  PAP / HPV 3/14/2014   PAP normal     Reviewed and updated as needed this visit by clinical staff  Tobacco  Allergies         Reviewed and updated as needed this visit by Provider        {HISTORY " "OPTIONS (Optional):387105}    Review of Systems  {FEMALE ROS (Optional):987115}     OBJECTIVE:   /88 (BP Location: Right arm, Patient Position: Chair, Cuff Size: Adult Large)   Pulse 64   Temp 97.4  F (36.3  C) (Oral)   Resp 16   Ht 1.727 m (5' 8\")   SpO2 99%   Breastfeeding No   BMI 28.28 kg/m    Physical Exam  {Exam Choices (Optional):002662}    {Diagnostic Test Results (Optional):228537::\"Diagnostic Test Results:\",\"Labs reviewed in Epic\"}    ASSESSMENT/PLAN:   {Diag Picklist:557094}    COUNSELING:  {FEMALE COUNSELING MESSAGES:742516::\"Reviewed preventive health counseling, as reflected in patient instructions\"}    Estimated body mass index is 28.28 kg/m  as calculated from the following:    Height as of this encounter: 1.727 m (5' 8\").    Weight as of 9/16/19: 84.4 kg (186 lb).    {Weight Management Plan (ACO) Complete if BMI is abnormal-  Ages 18-64  BMI >24.9.  Age 65+ with BMI <23 or >30 (Optional):490290}     reports that she has never smoked. She has never used smokeless tobacco.  {Tobacco Cessation -- Complete if patient is a smoker (Optional):887189}    Counseling Resources:  ATP IV Guidelines  Pooled Cohorts Equation Calculator  Breast Cancer Risk Calculator  FRAX Risk Assessment  ICSI Preventive Guidelines  Dietary Guidelines for Americans, 2010  [employer]'s MyPlate  ASA Prophylaxis  Lung CA Screening    Madison Cabral MD  [facility]  " Family member

## 2021-09-05 ENCOUNTER — HEALTH MAINTENANCE LETTER (OUTPATIENT)
Age: 56
End: 2021-09-05

## 2021-09-21 ENCOUNTER — OFFICE VISIT (OUTPATIENT)
Dept: INTERNAL MEDICINE | Facility: CLINIC | Age: 56
End: 2021-09-21
Payer: COMMERCIAL

## 2021-09-21 VITALS
DIASTOLIC BLOOD PRESSURE: 89 MMHG | OXYGEN SATURATION: 97 % | RESPIRATION RATE: 16 BRPM | BODY MASS INDEX: 27.74 KG/M2 | SYSTOLIC BLOOD PRESSURE: 134 MMHG | WEIGHT: 183 LBS | HEART RATE: 71 BPM | HEIGHT: 68 IN | TEMPERATURE: 98.3 F

## 2021-09-21 DIAGNOSIS — M25.661 KNEE STIFFNESS, RIGHT: ICD-10-CM

## 2021-09-21 DIAGNOSIS — I47.10 PAROXYSMAL SUPRAVENTRICULAR TACHYCARDIA (H): ICD-10-CM

## 2021-09-21 DIAGNOSIS — I49.3 PVC'S (PREMATURE VENTRICULAR CONTRACTIONS): Primary | ICD-10-CM

## 2021-09-21 DIAGNOSIS — N92.0 SPOTTING: ICD-10-CM

## 2021-09-21 DIAGNOSIS — Z23 NEED FOR PROPHYLACTIC VACCINATION AND INOCULATION AGAINST INFLUENZA: ICD-10-CM

## 2021-09-21 PROCEDURE — 90471 IMMUNIZATION ADMIN: CPT | Performed by: INTERNAL MEDICINE

## 2021-09-21 PROCEDURE — 90682 RIV4 VACC RECOMBINANT DNA IM: CPT | Performed by: INTERNAL MEDICINE

## 2021-09-21 PROCEDURE — 99214 OFFICE O/P EST MOD 30 MIN: CPT | Mod: 25 | Performed by: INTERNAL MEDICINE

## 2021-09-21 ASSESSMENT — MIFFLIN-ST. JEOR: SCORE: 1464.61

## 2021-09-21 NOTE — PROGRESS NOTES
"    Assessment & Plan     PVC's (premature ventricular contractions)  Benign pattern Will follow clinically for now.     Paroxysmal supraventricular tachycardia (H)  No change symptoms pattern    Spotting  offered reassurance Will follow clinically for now.     Knee stiffness, right  Usual Conservative treatment recommended.            No follow-ups on file.    Madison Cabral MD  [facility] DEMETRI Antonio is a 56 year old who presents for the following health issues     HPI     Follow up PVCs, endometrial biopsy, arthritis and colonoscopy.     She had a colonoscopy and a couple isolated PVCs were seen. She has a long history of SVT with occasional short palpitations and 1-2 times a year will last for up to an hr. She does not get lightheadedness, dizziness, or shortness of breath when this happens. When it last too long she uses cold water on her hands or face to stop it. There has been no change in palpitations since she was worked up in 2017 and 2018.     Her right knee gets stiff intermittently. Not painful. Wants to know what she can do for it. No swelling or erythema. Denies any trauma to the area or unusual physical activities that could have caused it.     She had a uterine ablation 10 years ago and now gets intermittent spotting. She had a hysteroscopy that was negative except for scarring from the ablation. She wants to know how worried she should be.     Review of Systems   Constitutional, HEENT, cardiovascular, pulmonary, GI, , musculoskeletal, neuro, skin, endocrine and psych systems are negative, except as otherwise noted.      Objective    /89   Pulse 71   Temp 98.3  F (36.8  C) (Oral)   Resp 16   Ht 1.721 m (5' 7.75\")   Wt 83 kg (183 lb)   SpO2 97%   Breastfeeding No   BMI 28.03 kg/m    Body mass index is 28.03 kg/m .  Physical Exam   GENERAL: healthy, alert and no distress  NECK: no adenopathy, no asymmetry, masses, or scars and thyroid normal to " palpation  RESP: lungs clear to auscultation - no rales, rhonchi or wheezes  CV: regular rate and rhythm, normal S1 S2, no S3 or S4, no murmur, click or rub, no peripheral edema and peripheral pulses strong  ABDOMEN: soft, nontender, no hepatosplenomegaly, no masses and bowel sounds normal  MS: no gross musculoskeletal defects noted, no edema  PSYCH: mentation appears normal, affect normal/bright

## 2021-10-07 DIAGNOSIS — G40.309 GENERALIZED CONVULSIVE EPILEPSY (H): Primary | ICD-10-CM

## 2021-10-11 RX ORDER — CARBAMAZEPINE 100 MG/1
CAPSULE, EXTENDED RELEASE ORAL
Qty: 630 CAPSULE | Refills: 0 | Status: SHIPPED | OUTPATIENT
Start: 2021-10-11 | End: 2021-12-07

## 2021-11-29 ENCOUNTER — MYC MEDICAL ADVICE (OUTPATIENT)
Dept: INTERNAL MEDICINE | Facility: CLINIC | Age: 56
End: 2021-11-29
Payer: COMMERCIAL

## 2021-12-07 ENCOUNTER — OFFICE VISIT (OUTPATIENT)
Dept: NEUROLOGY | Facility: CLINIC | Age: 56
End: 2021-12-07
Payer: COMMERCIAL

## 2021-12-07 VITALS
TEMPERATURE: 98 F | BODY MASS INDEX: 28.94 KG/M2 | SYSTOLIC BLOOD PRESSURE: 169 MMHG | WEIGHT: 184.4 LBS | DIASTOLIC BLOOD PRESSURE: 110 MMHG | HEART RATE: 78 BPM | HEIGHT: 67 IN

## 2021-12-07 DIAGNOSIS — G40.909 SEIZURE DISORDER (H): ICD-10-CM

## 2021-12-07 DIAGNOSIS — G40.309 GENERALIZED CONVULSIVE EPILEPSY (H): ICD-10-CM

## 2021-12-07 LAB — CARBAMAZEPINE SERPL-MCNC: 12.1 MG/L

## 2021-12-07 PROCEDURE — 80156 ASSAY CARBAMAZEPINE TOTAL: CPT | Performed by: PSYCHIATRY & NEUROLOGY

## 2021-12-07 RX ORDER — CARBAMAZEPINE 100 MG/1
CAPSULE, EXTENDED RELEASE ORAL
Qty: 630 CAPSULE | Refills: 0 | Status: SHIPPED | OUTPATIENT
Start: 2021-12-07 | End: 2022-02-28

## 2021-12-07 ASSESSMENT — MIFFLIN-ST. JEOR: SCORE: 1459.06

## 2021-12-07 NOTE — LETTER
2021     RE: Marisela Beaver  : 1965   MRN: [id number]      Dear Colleague,    Thank you for referring your patient, Marisela Beaver, to the [facility] EPILEPSY CARE at [facility]. Please see a copy of my visit note below.      [facility] Epilepsy Care Note - New Patient       Service Date:  21    HISTORY: Marisela Beaver is a 56 year old year old female with a history of SVT, DVT, PE, and R frontal menigioma s/p resection in 2007 c/b 2 episodes concerning for generalized tonic clonic seizures who presents to [facility]. Patient has been following Dr. De Oliveira since her initial seizures in 2008.    The patient has had a single type of spell which is described as full body shaking with loss of consciousness that is proceeded by episodes of staring. She reports that she has had 2 of these episodes in total, both of which occurred om 2008. Seizures occurred more than one year following resection of right frontal meningioma. She was found in her office following the first episode and the second episode occurred while in the ambulance en route to the hospital. She has been on various formulations of carbamazepine since that initial episode and has had no more events since starting this medication. Reports no side effects from this medication and reports good compliance with rare missed doses. Uses a pill box to assist with this. The patient cannot identify any triggers for this first episode, reporting that she does not recall being sleep deprived and did not have increased stress at that time. She denies having any more episodes of staring, losing time, rhythmic movements, and other concerning findings.       Patient reports no significant history of hypertension. Reports that her blood pressure is typically in the 110s systolic and she is not on nor has she ever been on a BP medication. Has occasional palpitations but denies chest pain and  "shortness of breath.  She denies diabetes, tobacco use. She denies significant headache or encephalopathy today.    Epilepsy-seizure predispositions:  The patient had a right frontal meningioma that was resected in 2007. The patient has no family history of epilepsy or seizures. She has no history of gestational or  injury, febrile convulsions, developmental delay, stroke, meningitis, encephalitis, significant head injury, or other epileptic predispositions.       Laboratory evaluations:  MRI Brain 2017 showed post-operative changes of meningioma resection but did not show any enhancement. She has had three further interval MRIs between resection and 2017 study.    EEG 2008: Focal slowing in R lateral frontal brain without seizures or interictal epileptiform discharges    EEG 10/30/2009: R frontal delta focal slowing with questionable right frontal epileptiform discharges      EEG 2020: Normal EEG without epileptiform discharges or electrographic seizures    Epilepsy therapeutics:  The patient has been maintained on Tegretol 300 - 400 since seizure onset with no side effects.    PAST MEDICAL-SURGICAL HISTORY:  1. R frontal meningioma s/p resection  2. Generalized seizure disorder  3. SVT  4. DVT/PE    PERSONAL AND SOCIAL HISTORY:  Patient works as a  for [employer]. Currently working at home but will be transitioning to working in person again soon. Driving regularly. Lives with  and 24 year old son in house in [place]. Additionally, has a daughter (also 24 years old) who is currently in grad school. Patient drinks socially on weekends and denies tobacco or drug use. Reports exercising regularly.    REVIEW OF SYSTEMS:  12 point review of systems obtained and negative except as mentioned in the HPI    MEDICATIONS:  1. Carbamazepine 300 mg - 400 mg    PHYSICAL EXAMINATION:  BP (!) 169/110   Pulse 78   Temp 98  F (36.7  C) (Temporal)   Ht 5' 7\" (170.2 cm)   Wt 184 " lb 6.4 oz (83.6 kg)   BMI 28.88 kg/m    General: Pleasant woman in NAD  HEENT: Normocephalic, atraumatic, no epistaxis, no oral lesions, no carotid bruit  Resp: Breathing comfortably on room air  Cardio: RRR, S1/S2 appropriate, no m/r/g  Psych: Normal mood and affect    Neuro:  Mental status: Awake and alert, oriented to person, place, year, month, day, and president. Able to identify number of quarters in $2.25. Fully cooperative.   Speech: Fluent, comprehension and repetition intact; No dysarthria, no paraphasic errors noted  Cranial nerves: Visual fields intact, Eyes conjugate, EOMI w/ normal and smooth pursuit, slight L facial droop, facial sensation intact to light touch and symmetric, hearing intact to conversation, shoulder shrug strong, palate rise symmetric, tongue/uvula midline.  Motor: Tone normal. LUE is 5/5, RUE is 5/5, LLE is 5/5, RLE is 5/5. No abnormal movements noted. Pronator drift absent. Finger tapping symmetric.   Reflexes: Brisk and symmetric patellar reflexes, 2+ biceps and brachioradialis reflexes No crossed adductors or spread.   Sensory: Intact to light touch in all 4 extremities  Coordination: FNF intact bilaterally with no dysmetria  Gait: Normal width, stride length, turn, and arm swing. Station normal.     IMPRESSION:  56 year old woman with history of right frontal menigioma s/p resection in 09/2007 c/b 2 episodes concerning for generalized tonic clonic seizures that is currently on Tegretol. Exam unremarkable other than subtle L facial droop that patient reports has been present since her resection. EEG in 2009 with questionable R frontal sharps and MRI in 2017 without meningioma recurrence. The patient has been seizure free for almost 13 years at this point and has had no lab abnormalities or side effects from the carbamazepine. Her initial seizures occurred approximately 1 year after the resection of the meningioma. Given the length of time that the patient has been seizure free  and the concern for atherosclerosis and osteopenia/osteoporosis with long term carbamazepine would favor either stopping anti-convulsants all together or transitioning the patient to a different sodium channel blocker (favor lamotrigine or lacosamide). Discussed these two approaches with the patient and she is unsure of which option she would prefer at this point and will consider it prior to the next visit in 4 weeks. For now, will obtain a MRI w/ and w/o to screen for meningioma recurrence and get repeat levels of carbamazepine. The patient recently had a BMP, LFTs, and a CBC so no need for repeat studies at this point.    The patient was found to be hypertensive with initial readings of 169/110 and subsequent readings 30 mins later of 145/102 and 150/105 that were similar in both arms. Denied significant stress or caffeine intake this AM. She was mildly hypertensive at her last visit and suspect that she is developing hypertension. Recommend to the patient to increase exercise regularly and reduce salt intake. Additionally, recommended that she follow-up with her PCP for further management of her hypertension.    PLAN:  1)  Continue carbamazepine BID 300mg-400mg  2)  Carbamazepine level today  3)  MRI brain with and without contrast  4) Telephone visit in 4 weeks to discuss transitioning medications vs stopping anti-convulsants    Patient seen and discussed with attending epileptologist, Dr. Camarena.    Vipin Jameson MD  PGY-2 Neurology Resident    I personally interviewed and examined patient. Reviewed note and made changes as appropriate.  Sounds like she did have seizures and these occurred about one year following resection. There has been no recurrence of meningioma for more than ten years. Not at all clear she requires AEDs at this point; Lamberink model suggests she has a 20 to 30% risk of recurrence off AEDs. Model may underestimate because she has persistent structural abnormality. In my view, risk of  bone density decline and atherogenesis associated with carbamazepine probably exceeds risk of seizure at this point. She was not keen on discontinuing AEDs but ws open changing to another AED; we discussed lacosamide and lamotrigine as alternatives. I told her there was no guarantee that these would be as effective but because I think her seizure tendency is low it is reasonable that all AEDs would help. No guarantee that new meds would not cause side effects. She will see if her plan covers lacosamide and we will reassess after her MRI. Urged her to follow up HTN with primary care.    Additional time in coordination of cares beyond time spent by resident staff:  28 minutes. Additional chart review prior to visit 11 minutes. Review of note and other coordination of care 4 minutes.Total time I personally spent on day of visit 43 minutes.    Again, thank you for allowing me to participate in the care of your patient.      Sincerely,    Jaspreet Camarena MD

## 2021-12-07 NOTE — PROGRESS NOTES
[facility] Epilepsy Care Note - New Patient       Service Date:  12/07/21    HISTORY: Marisela Beaver is a 56 year old year old female with a history of SVT, DVT, PE, and R frontal menigioma s/p resection in 09/2007 c/b 2 episodes concerning for generalized tonic clonic seizures who presents to [facility]. Patient has been following Dr. De Oliveira since her initial seizures in 11/2008.    The patient has had a single type of spell which is described as full body shaking with loss of consciousness that is proceeded by episodes of staring. She reports that she has had 2 of these episodes in total, both of which occurred om 11/20/2008. Seizures occurred more than one year following resection of right frontal meningioma. She was found in her office following the first episode and the second episode occurred while in the ambulance en route to the hospital. She has been on various formulations of carbamazepine since that initial episode and has had no more events since starting this medication. Reports no side effects from this medication and reports good compliance with rare missed doses. Uses a pill box to assist with this. The patient cannot identify any triggers for this first episode, reporting that she does not recall being sleep deprived and did not have increased stress at that time. She denies having any more episodes of staring, losing time, rhythmic movements, and other concerning findings.       Patient reports no significant history of hypertension. Reports that her blood pressure is typically in the 110s systolic and she is not on nor has she ever been on a BP medication. Has occasional palpitations but denies chest pain and shortness of breath.  She denies diabetes, tobacco use. She denies significant headache or encephalopathy today.    Epilepsy-seizure predispositions:  The patient had a right frontal meningioma that was resected in 09/2007. The patient has no family history of epilepsy or seizures. She  "has no history of gestational or  injury, febrile convulsions, developmental delay, stroke, meningitis, encephalitis, significant head injury, or other epileptic predispositions.       Laboratory evaluations:  MRI Brain 2017 showed post-operative changes of meningioma resection but did not show any enhancement. She has had three further interval MRIs between resection and 2017 study.    EEG 2008: Focal slowing in R lateral frontal brain without seizures or interictal epileptiform discharges    EEG 10/30/2009: R frontal delta focal slowing with questionable right frontal epileptiform discharges      EEG 2020: Normal EEG without epileptiform discharges or electrographic seizures    Epilepsy therapeutics:  The patient has been maintained on Tegretol 300 - 400 since seizure onset with no side effects.    PAST MEDICAL-SURGICAL HISTORY:  1. R frontal meningioma s/p resection  2. Generalized seizure disorder  3. SVT  4. DVT/PE    PERSONAL AND SOCIAL HISTORY:  Patient works as a  for [employer]. Currently working at home but will be transitioning to working in person again soon. Driving regularly. Lives with  and 24 year old son in house in [place]. Additionally, has a daughter (also 24 years old) who is currently in grad school. Patient drinks socially on weekends and denies tobacco or drug use. Reports exercising regularly.    REVIEW OF SYSTEMS:  12 point review of systems obtained and negative except as mentioned in the HPI    MEDICATIONS:  1. Carbamazepine 300 mg - 400 mg    PHYSICAL EXAMINATION:  BP (!) 169/110   Pulse 78   Temp 98  F (36.7  C) (Temporal)   Ht 5' 7\" (170.2 cm)   Wt 184 lb 6.4 oz (83.6 kg)   BMI 28.88 kg/m    General: Pleasant woman in NAD  HEENT: Normocephalic, atraumatic, no epistaxis, no oral lesions, no carotid bruit  Resp: Breathing comfortably on room air  Cardio: RRR, S1/S2 appropriate, no m/r/g  Psych: Normal mood and affect    Neuro:  Mental " status: Awake and alert, oriented to person, place, year, month, day, and president. Able to identify number of quarters in $2.25. Fully cooperative.   Speech: Fluent, comprehension and repetition intact; No dysarthria, no paraphasic errors noted  Cranial nerves: Visual fields intact, Eyes conjugate, EOMI w/ normal and smooth pursuit, slight L facial droop, facial sensation intact to light touch and symmetric, hearing intact to conversation, shoulder shrug strong, palate rise symmetric, tongue/uvula midline.  Motor: Tone normal. LUE is 5/5, RUE is 5/5, LLE is 5/5, RLE is 5/5. No abnormal movements noted. Pronator drift absent. Finger tapping symmetric.   Reflexes: Brisk and symmetric patellar reflexes, 2+ biceps and brachioradialis reflexes No crossed adductors or spread.   Sensory: Intact to light touch in all 4 extremities  Coordination: FNF intact bilaterally with no dysmetria  Gait: Normal width, stride length, turn, and arm swing. Station normal.     IMPRESSION:  56 year old woman with history of right frontal menigioma s/p resection in 09/2007 c/b 2 episodes concerning for generalized tonic clonic seizures that is currently on Tegretol. Exam unremarkable other than subtle L facial droop that patient reports has been present since her resection. EEG in 2009 with questionable R frontal sharps and MRI in 2017 without meningioma recurrence. The patient has been seizure free for almost 13 years at this point and has had no lab abnormalities or side effects from the carbamazepine. Her initial seizures occurred approximately 1 year after the resection of the meningioma. Given the length of time that the patient has been seizure free and the concern for atherosclerosis and osteopenia/osteoporosis with long term carbamazepine would favor either stopping anti-convulsants all together or transitioning the patient to a different sodium channel blocker (favor lamotrigine or lacosamide). Discussed these two approaches with  the patient and she is unsure of which option she would prefer at this point and will consider it prior to the next visit in 4 weeks. For now, will obtain a MRI w/ and w/o to screen for meningioma recurrence and get repeat levels of carbamazepine. The patient recently had a BMP, LFTs, and a CBC so no need for repeat studies at this point.    The patient was found to be hypertensive with initial readings of 169/110 and subsequent readings 30 mins later of 145/102 and 150/105 that were similar in both arms. Denied significant stress or caffeine intake this AM. She was mildly hypertensive at her last visit and suspect that she is developing hypertension. Recommend to the patient to increase exercise regularly and reduce salt intake. Additionally, recommended that she follow-up with her PCP for further management of her hypertension.    PLAN:  1)  Continue carbamazepine BID 300mg-400mg  2)  Carbamazepine level today  3)  MRI brain with and without contrast  4) Telephone visit in 4 weeks to discuss transitioning medications vs stopping anti-convulsants    Patient seen and discussed with attending epileptologist, Dr. Camarena.    Vipin Jameson MD  PGY-2 Neurology Resident    I personally interviewed and examined patient. Reviewed note and made changes as appropriate.  Sounds like she did have seizures and these occurred about one year following resection. There has been no recurrence of meningioma for more than ten years. Not at all clear she requires AEDs at this point; Lamberink model suggests she has a 20 to 30% risk of recurrence off AEDs. Model may underestimate because she has persistent structural abnormality. In my view, risk of bone density decline and atherogenesis associated with carbamazepine probably exceeds risk of seizure at this point. She was not keen on discontinuing AEDs but ws open changing to another AED; we discussed lacosamide and lamotrigine as alternatives. I told her there was no guarantee that  these would be as effective but because I think her seizure tendency is low it is reasonable that all AEDs would help. No guarantee that new meds would not cause side effects. She will see if her plan covers lacosamide and we will reassess after her MRI. Urged her to follow up HTN with primary care.    Additional time in coordination of cares beyond time spent by resident staff:  28 minutes. Additional chart review prior to visit 11 minutes. Review of note and other coordination of care 4 minutes.Total time I personally spent on day of visit 43 minutes.

## 2021-12-07 NOTE — PATIENT INSTRUCTIONS
Other medications to consider: lamotrigine and lacosamide. Lacosamide (brandname vimpat) would easier to change to but is not available in generic and may have higher copays.    Your systolic blood consistently in 150's, diastolic 105 to 110 today. Would follow up with primary care. In the meantime would avoid all salt.

## 2021-12-26 ENCOUNTER — ANCILLARY PROCEDURE (OUTPATIENT)
Dept: MRI IMAGING | Facility: CLINIC | Age: 56
End: 2021-12-26
Attending: PSYCHIATRY & NEUROLOGY
Payer: COMMERCIAL

## 2021-12-26 DIAGNOSIS — G40.309 GENERALIZED CONVULSIVE EPILEPSY (H): ICD-10-CM

## 2021-12-26 PROCEDURE — A9585 GADOBUTROL INJECTION: HCPCS | Performed by: RADIOLOGY

## 2021-12-26 PROCEDURE — 70553 MRI BRAIN STEM W/O & W/DYE: CPT | Performed by: RADIOLOGY

## 2021-12-26 RX ORDER — GADOBUTROL 604.72 MG/ML
10 INJECTION INTRAVENOUS ONCE
Status: COMPLETED | OUTPATIENT
Start: 2021-12-26 | End: 2021-12-26

## 2021-12-26 RX ADMIN — GADOBUTROL 8.5 ML: 604.72 INJECTION INTRAVENOUS at 11:00

## 2021-12-27 ENCOUNTER — VIRTUAL VISIT (OUTPATIENT)
Dept: INTERNAL MEDICINE | Facility: CLINIC | Age: 56
End: 2021-12-27
Payer: COMMERCIAL

## 2021-12-27 DIAGNOSIS — R03.0 ELEVATED BP WITHOUT DIAGNOSIS OF HYPERTENSION: Primary | ICD-10-CM

## 2021-12-27 DIAGNOSIS — G40.909 SEIZURE DISORDER (H): ICD-10-CM

## 2021-12-27 DIAGNOSIS — Z13.820 SCREENING FOR OSTEOPOROSIS: ICD-10-CM

## 2021-12-27 PROCEDURE — 99214 OFFICE O/P EST MOD 30 MIN: CPT | Mod: GT | Performed by: INTERNAL MEDICINE

## 2021-12-27 NOTE — PROGRESS NOTES
"Marisela is a 56 year old who is being evaluated via a billable video visit.      How would you like to obtain your AVS? MyChart  If the video visit is dropped, the invitation should be resent by: Text to cell phone: [phone]  Will anyone else be joining your video visit? No      Video Start Time: 5:22 PM    Assessment & Plan     Elevated BP without diagnosis of hypertension  Her blood pressures at home have been in good range (in a reliable patient) will not treat at this time    Seizure disorder (H)  My recommendation is that she remain on seizure prevention medication. And as she has tolerated carbamazepine well I would not recommend changing it. Especially if bone density in good range.     Screening for osteoporosis  Will screen for osteoporosis as she is on above med.   - DX Hip/Pelvis/Spine; Future           BMI:   Estimated body mass index is 28.88 kg/m  as calculated from the following:    Height as of 12/7/21: 1.702 m (5' 7\").    Weight as of 12/7/21: 83.6 kg (184 lb 6.4 oz).   Weight management plan: Discussed healthy diet and exercise guidelines      Return in about 6 months (around 6/27/2022).    Madison Cabral MD  [facility] Healthcare[facility]OM[facility] Antonio is a 56 year old who presents for the following health issues     HPI     Hypertension Follow-up      Do you check your blood pressure regularly outside of the clinic? Yes     Are you following a low salt diet? No    Are your blood pressures ever more than 140 on the top number (systolic) OR more   than 90 on the bottom number (diastolic), for example 140/90? Yes    She has had some elevated blood pressures in the office. So she has been checking her blood pressures at home regularly. She is generally in the 120/80 range if any number tends to be high the diastolic can run close to 90 or at 90 at times. She has never been on blood pressure medication but hypertension runs in her family.     She had a benigna brain tumor removed " over 10 years ago. 1 year after the surgery she had a grand mal seizure. She was placed on Carbamazepine and has had no problems since. Because her seizure happened 1 year after surgery her Neurologist felt she should be on medication for life. She has had no side effects or problems to take the Carbamazepine. her new Neurologist wants her to either try stopping the medication or change to a newer anti seizure medication. She wants to know what I think.      Review of Systems   Constitutional, HEENT, cardiovascular, pulmonary, GI, , musculoskeletal, neuro, skin, endocrine and psych systems are negative, except as otherwise noted.      Objective           Vitals:  No vitals were obtained today due to virtual visit.    Physical Exam   GENERAL: Healthy, alert and no distress  EYES: Eyes grossly normal to inspection.  No discharge or erythema, or obvious scleral/conjunctival abnormalities.  RESP: No audible wheeze, cough, or visible cyanosis.  No visible retractions or increased work of breathing.    SKIN: Visible skin clear. No significant rash, abnormal pigmentation or lesions.  NEURO: Cranial nerves grossly intact.  Mentation and speech appropriate for age.  PSYCH: Mentation appears normal, affect normal/bright, judgement and insight intact, normal speech and appearance well-groomed.          Video-Visit Details    Type of service:  Video Visit    Video End Time:5:36 PM    Originating Location (pt. Location): Home    Distant Location (provider location):  [facility]     Platform used for Video Visit: [employer]

## 2021-12-28 VITALS — DIASTOLIC BLOOD PRESSURE: 88 MMHG | SYSTOLIC BLOOD PRESSURE: 129 MMHG

## 2022-01-13 ENCOUNTER — VIRTUAL VISIT (OUTPATIENT)
Dept: NEUROLOGY | Facility: CLINIC | Age: 57
End: 2022-01-13
Payer: COMMERCIAL

## 2022-01-13 DIAGNOSIS — G40.909 SEIZURE DISORDER (H): ICD-10-CM

## 2022-01-13 NOTE — PROGRESS NOTES
[facility] Epilepsy Care Progress Note      Patient:  Marisela Beaver  :  1965   Age:  56 year old   Today's Office Visit:  2022    Epilepsy Data:    Seizure Record  Current Visit Date: 22  Previous Visit Date: 21  Months since last visit:   Seizure Type 1: Tonic-clonic seizures  Description of Sz Type 1: convulsions  # of Type 1 Seizure since last visit: 0  Freq. Type 1 / Month: 0    Background History:  Right frontal meningioma resection . Two probable BTC seizures , approx one year following resection. Treated with carbamazepine 700 mg/d with therapeutic levels since. Serial MRI's last  without recurrence. EEGs with intermittent right frontal slowing, possible right frontal IEDs in one study. EEG 2020 normal.    History of Present Illness:     No seizures since last seen.  Tracked BP for next several weeks, averaged 130/90.  She checked with insurance; both lacosamide and lamotrigine are covered, lacosamide more expensive.    Bone density scan is planned. She is not excited about discontinuation of AEDs but is open to changing to another AED.  Has reviewed online information for both lamotrigine and lacosamide.      Current Outpatient Medications   Medication Sig Dispense Refill     carBAMazepine (CARBATROL) 100 MG 12 hr capsule TAKE 3 CAPSULES BY MOUTH IN THE MORNING AND 4 CAPSULES  BY MOUTH IN THE EVENING 630 capsule 0     IBUPROFEN PO Take  by mouth as needed.       verapamil (CALAN) 80 MG tablet Take 1 tablet (80 mg) by mouth 3 times daily as needed 5 tablet 3        Perceived AED Side Effects:  No    Medication Notes:  No side effects.    Exam:    There were no vitals taken for this visit.     Wt Readings from Last 5 Encounters:   21 184 lb 6.4 oz (83.6 kg)   21 183 lb (83 kg)   21 185 lb (83.9 kg)   19 186 lb (84.4 kg)   19 187 lb (84.8 kg)     No examination today.    MRI of brain personally reviewed. Right frontal  encephalomalacia. No recurrence of neoplasm. Some white matter disease. When similar sequences compared, no significant change from last study in 2011.  Carbamazepine level = 12.1. This is higher than she has usually run tho 12.2 was recorded in the past.    IMPRESSION  1) Status post resection right frontal meningioma 2007. No recurrence. Some right frontal encephalomalacia.  2) Two seizures about one year after resection. No subsequent seizures. Not clear she requires continued AED treatment. Lamberink model suggests 20-30% risk but this may be an underestimate.  Probably does not need carbamazepine level of 12.1 to control seizures at this point. Tho she does not show signs of toxicity, there is concern about chronic effects on bone and atherogenesis.    DISCUSSION  Above again discussed. We discussed transitioning to lamotrigine or lacosamide and what this would entail. We discussed discontinuation. She did not want to stop AEDs or make significant changes.  She wanted to check DEXA scan and use that to help make a decision. She was open to reducing carbamazepine to 300 mg twice a day.    PLAN:  1) Reduce carbamazepine to 300 mg twice a day.  2) Obtain DEXA scan with primary care.  3) Follow up in two months.    Total time on video today 22 minutes. 3 minutes reviewing chart prior to visit. Reviewed two original  MRIs and compared. 7 minutes generating note after visit. Total 32 minutes on day of visit.    Jaspreet Camarena MD     yes

## 2022-01-13 NOTE — LETTER
2022     RE: Marisela Beaver  : 1965   MRN: [id number]      Dear Colleague,    Thank you for referring your patient, Marisela Beaver, to the [facility] EPILEPSY CARE at [facility]. Please see a copy of my visit note below.    Marisela is a 56 year old who is being evaluated via a billable video visit.      How would you like to obtain your AVS? MyChart  If the video visit is dropped, the invitation should be resent by: Text to cell phone: [phone]  Will anyone else be joining your video visit? No   Please see physician note for televisit parameters.    Jaspreet Camarena MD      [facility] Epilepsy Care Progress Note      Patient:  Marisela Beaver  :  1965   Age:  56 year old   Today's Office Visit:  2022    Epilepsy Data:    Seizure Record  Current Visit Date: 22  Previous Visit Date: 21  Months since last visit:   Seizure Type 1: Tonic-clonic seizures  Description of Sz Type 1: convulsions  # of Type 1 Seizure since last visit: 0  Freq. Type 1 / Month: 0    Background History:  Right frontal meningioma resection . Two probable BTC seizures , approx one year following resection. Treated with carbamazepine 700 mg/d with therapeutic levels since. Serial MRI's last  without recurrence. EEGs with intermittent right frontal slowing, possible right frontal IEDs in one study. EEG 2020 normal.    History of Present Illness:     No seizures since last seen.  Tracked BP for next several weeks, averaged 130/90.  She checked with insurance; both lacosamide and lamotrigine are covered, lacosamide more expensive.    Bone density scan is planned. She is not excited about discontinuation of AEDs but is open to changing to another AED.  Has reviewed online information for both lamotrigine and lacosamide.    Current Outpatient Medications   Medication Sig Dispense Refill     carBAMazepine (CARBATROL) 100  MG 12 hr capsule TAKE 3 CAPSULES BY MOUTH IN THE MORNING AND 4 CAPSULES  BY MOUTH IN THE EVENING 630 capsule 0     IBUPROFEN PO Take  by mouth as needed.       verapamil (CALAN) 80 MG tablet Take 1 tablet (80 mg) by mouth 3 times daily as needed 5 tablet 3      Perceived AED Side Effects:  No    Medication Notes:  No side effects.    Exam:    There were no vitals taken for this visit.     Wt Readings from Last 5 Encounters:   12/07/21 184 lb 6.4 oz (83.6 kg)   09/21/21 183 lb (83 kg)   04/14/21 185 lb (83.9 kg)   09/16/19 186 lb (84.4 kg)   08/07/19 187 lb (84.8 kg)     No examination today.    MRI of brain personally reviewed. Right frontal encephalomalacia. No recurrence of neoplasm. Some white matter disease. When similar sequences compared, no significant change from last study in 2011.  Carbamazepine level = 12.1. This is higher than she has usually run tho 12.2 was recorded in the past.    IMPRESSION  1) Status post resection right frontal meningioma 2007. No recurrence. Some right frontal encephalomalacia.  2) Two seizures about one year after resection. No subsequent seizures. Not clear she requires continued AED treatment. Lamberink model suggests 20-30% risk but this may be an underestimate.  Probably does not need carbamazepine level of 12.1 to control seizures at this point. Tho she does not show signs of toxicity, there is concern about chronic effects on bone and atherogenesis.    DISCUSSION  Above again discussed. We discussed transitioning to lamotrigine or lacosamide and what this would entail. We discussed discontinuation. She did not want to stop AEDs or make significant changes.  She wanted to check DEXA scan and use that to help make a decision. She was open to reducing carbamazepine to 300 mg twice a day.    PLAN:  1) Reduce carbamazepine to 300 mg twice a day.  2) Obtain DEXA scan with primary care.  3) Follow up in two months.    Total time on video today 22 minutes. 3 minutes reviewing chart  prior to visit. Reviewed two original  MRIs and compared. 7 minutes generating note after visit. Total 32 minutes on day of visit.    Jaspreet Camarena MD

## 2022-01-13 NOTE — PROGRESS NOTES
Marisela is a 56 year old who is being evaluated via a billable video visit.      How would you like to obtain your AVS? MyChart  If the video visit is dropped, the invitation should be resent by: Text to cell phone: [phone]  Will anyone else be joining your video visit? No   Please see physician note for televisit parameters.    Jaspreet Camarena MD

## 2022-01-24 ENCOUNTER — ANCILLARY PROCEDURE (OUTPATIENT)
Dept: BONE DENSITY | Facility: CLINIC | Age: 57
End: 2022-01-24
Attending: INTERNAL MEDICINE
Payer: COMMERCIAL

## 2022-01-24 DIAGNOSIS — Z13.820 SCREENING FOR OSTEOPOROSIS: ICD-10-CM

## 2022-01-24 PROCEDURE — 77080 DXA BONE DENSITY AXIAL: CPT | Performed by: INTERNAL MEDICINE

## 2022-02-01 ENCOUNTER — MYC MEDICAL ADVICE (OUTPATIENT)
Dept: INTERNAL MEDICINE | Facility: CLINIC | Age: 57
End: 2022-02-01
Payer: COMMERCIAL

## 2022-02-02 NOTE — TELEPHONE ENCOUNTER
Please see patient's mychart message below.    Last virtual visit 12-27-21    Please advise, thanks.

## 2022-02-23 DIAGNOSIS — G40.309 GENERALIZED CONVULSIVE EPILEPSY (H): Primary | ICD-10-CM

## 2022-02-24 ENCOUNTER — TRANSFERRED RECORDS (OUTPATIENT)
Dept: HEALTH INFORMATION MANAGEMENT | Facility: CLINIC | Age: 57
End: 2022-02-24
Payer: COMMERCIAL

## 2022-02-28 RX ORDER — CARBAMAZEPINE 100 MG/1
CAPSULE, EXTENDED RELEASE ORAL
Qty: 630 CAPSULE | Refills: 3 | Status: SHIPPED | OUTPATIENT
Start: 2022-02-28 | End: 2023-01-31

## 2022-06-09 ENCOUNTER — TRANSFERRED RECORDS (OUTPATIENT)
Dept: HEALTH INFORMATION MANAGEMENT | Facility: CLINIC | Age: 57
End: 2022-06-09
Payer: COMMERCIAL

## 2022-06-11 ENCOUNTER — HEALTH MAINTENANCE LETTER (OUTPATIENT)
Age: 57
End: 2022-06-11

## 2022-07-31 ASSESSMENT — ENCOUNTER SYMPTOMS
COUGH: 0
PARESTHESIAS: 0
EYE PAIN: 0
CONSTIPATION: 0
MYALGIAS: 1
ARTHRALGIAS: 0
WEAKNESS: 0
HEARTBURN: 0
PALPITATIONS: 0
DYSURIA: 0
DIARRHEA: 0
FEVER: 0
SHORTNESS OF BREATH: 0
FREQUENCY: 0
NAUSEA: 0
HEADACHES: 0
HEMATURIA: 0
BREAST MASS: 0
NERVOUS/ANXIOUS: 0
CHILLS: 0
JOINT SWELLING: 0
ABDOMINAL PAIN: 0
HEMATOCHEZIA: 0
DIZZINESS: 0

## 2022-08-01 ENCOUNTER — OFFICE VISIT (OUTPATIENT)
Dept: INTERNAL MEDICINE | Facility: CLINIC | Age: 57
End: 2022-08-01
Payer: COMMERCIAL

## 2022-08-01 VITALS
RESPIRATION RATE: 16 BRPM | WEIGHT: 182 LBS | HEART RATE: 80 BPM | DIASTOLIC BLOOD PRESSURE: 86 MMHG | SYSTOLIC BLOOD PRESSURE: 138 MMHG | TEMPERATURE: 97.2 F | OXYGEN SATURATION: 98 % | HEIGHT: 67 IN | BODY MASS INDEX: 28.56 KG/M2

## 2022-08-01 DIAGNOSIS — Z00.00 PREVENTATIVE HEALTH CARE: Primary | ICD-10-CM

## 2022-08-01 DIAGNOSIS — I47.10 PAROXYSMAL SUPRAVENTRICULAR TACHYCARDIA (H): ICD-10-CM

## 2022-08-01 PROCEDURE — 99396 PREV VISIT EST AGE 40-64: CPT | Performed by: INTERNAL MEDICINE

## 2022-08-01 ASSESSMENT — ENCOUNTER SYMPTOMS
HEMATOCHEZIA: 0
PALPITATIONS: 0
DIARRHEA: 0
ARTHRALGIAS: 0
NERVOUS/ANXIOUS: 0
FREQUENCY: 0
SHORTNESS OF BREATH: 0
ABDOMINAL PAIN: 0
BREAST MASS: 0
FEVER: 0
MYALGIAS: 1
EYE PAIN: 0
NAUSEA: 0
DIZZINESS: 0
JOINT SWELLING: 0
HEADACHES: 0
DYSURIA: 0
CHILLS: 0
HEMATURIA: 0
WEAKNESS: 0
PARESTHESIAS: 0
HEARTBURN: 0
CONSTIPATION: 0
COUGH: 0

## 2022-08-01 NOTE — PROGRESS NOTES
The 10-year ASCVD risk score (Gail GAONA Jr., et al., 2013) is: 2.4%    Values used to calculate the score:      Age: 57 years      Sex: Female      Is Non- : No      Diabetic: No      Tobacco smoker: No      Systolic Blood Pressure: 138 mmHg      Is BP treated: No      HDL Cholesterol: 80 mg/dL      Total Cholesterol: 236 mg/dL     SUBJECTIVE:   CC: Marisela Beaver is an 57 year old woman who presents for preventive health visit.     Fasting. Discuss BP.    Patient has been advised of split billing requirements and indicates understanding: Yes      Healthy Habits:     Getting at least 3 servings of Calcium per day:  Yes    Bi-annual eye exam:  Yes    Dental care twice a year:  Yes    Sleep apnea or symptoms of sleep apnea:  None    Diet:  Regular (no restrictions)    Frequency of exercise:  4-5 days/week    Duration of exercise:  45-60 minutes    Taking medications regularly:  Yes    Medication side effects:  None    PHQ-2 Total Score: 0    Additional concerns today:  Yes      Today's PHQ-2 Score:   PHQ-2 ( 1999 [employer]) 7/31/2022   Q1: Little interest or pleasure in doing things 0   Q2: Feeling down, depressed or hopeless 0   PHQ-2 Score 0   PHQ-2 Total Score (12-17 Years)- Positive if 3 or more points; Administer PHQ-A if positive -   Q1: Little interest or pleasure in doing things Not at all   Q2: Feeling down, depressed or hopeless Not at all   PHQ-2 Score 0       Abuse: Current or Past (Physical, Sexual or Emotional) - No  Do you feel safe in your environment? Yes        Social History     Tobacco Use     Smoking status: Never Smoker     Smokeless tobacco: Never Used   Substance Use Topics     Alcohol use: Yes     Alcohol/week: 3.0 standard drinks     Types: 3 Standard drinks or equivalent per week     Comment: occ     If you drink alcohol do you typically have >3 drinks per day or >7 drinks per week? No    Alcohol Use 8/1/2022   Prescreen: >3 drinks/day or >7 drinks/week? -   Prescreen:  >3 drinks/day or >7 drinks/week? No       Reviewed orders with patient.  Reviewed health maintenance and updated orders accordingly - Yes  BP Readings from Last 3 Encounters:   08/01/22 138/86   12/27/21 129/88   12/07/21 (!) 169/110    Wt Readings from Last 3 Encounters:   08/01/22 82.6 kg (182 lb)   12/07/21 83.6 kg (184 lb 6.4 oz)   09/21/21 83 kg (183 lb)                    Breast Cancer Screening:    FHS-7:   Breast CA Risk Assessment (FHS-7) 7/31/2022   Did any of your first-degree relatives have breast or ovarian cancer? Yes   Did any of your relatives have bilateral breast cancer? No   Did any man in your family have breast cancer? No   Did any woman in your family have breast and ovarian cancer? No   Did any woman in your family have breast cancer before age 50 y? No   Do you have 2 or more relatives with breast and/or ovarian cancer? Yes   Do you have 2 or more relatives with breast and/or bowel cancer? Yes       Mammogram Screening: Recommended mammography every 1-2 years with patient discussion and risk factor consideration  Pertinent mammograms are reviewed under the imaging tab.    History of abnormal Pap smear: NO - age 30-65 PAP every 5 years with negative HPV co-testing recommended  PAP / HPV 3/14/2014   PAP (Historical) normal     Reviewed and updated as needed this visit by clinical staff   Tobacco  Allergies  Meds  Problems  Med Hx  Surg Hx  Fam Hx  Soc   Hx          Reviewed and updated as needed this visit by Provider                       Review of Systems   Constitutional: Negative for chills and fever.   HENT: Negative for congestion, ear pain and hearing loss.    Eyes: Negative for pain and visual disturbance.   Respiratory: Negative for cough and shortness of breath.    Cardiovascular: Negative for chest pain, palpitations and peripheral edema.   Gastrointestinal: Negative for abdominal pain, constipation, diarrhea, heartburn, hematochezia and nausea.   Breasts:  Negative for  "tenderness, breast mass and discharge.   Genitourinary: Negative for dysuria, frequency, genital sores, hematuria, pelvic pain, urgency, vaginal bleeding and vaginal discharge.   Musculoskeletal: Positive for myalgias. Negative for arthralgias and joint swelling.   Skin: Negative for rash.   Neurological: Negative for dizziness, weakness, headaches and paresthesias.   Psychiatric/Behavioral: Negative for mood changes. The patient is not nervous/anxious.           OBJECTIVE:   /86   Pulse 80   Temp 97.2  F (36.2  C) (Oral)   Resp 16   Ht 1.702 m (5' 7\")   Wt 82.6 kg (182 lb)   SpO2 98%   Breastfeeding No   BMI 28.51 kg/m    Physical Exam  GENERAL: healthy, alert and no distress  EYES: Eyes grossly normal to inspection, PERRL and conjunctivae and sclerae normal  NECK: no adenopathy, no asymmetry, masses, or scars and thyroid normal to palpation  RESP: lungs clear to auscultation - no rales, rhonchi or wheezes  CV: regular rate and rhythm, normal S1 S2, no S3 or S4, no murmur, click or rub, no peripheral edema and peripheral pulses strong  ABDOMEN: soft, nontender, no hepatosplenomegaly, no masses and bowel sounds normal  MS: no gross musculoskeletal defects noted, no edema  SKIN: no suspicious lesions or rashes  NEURO: Normal strength and tone, mentation intact and speech normal  PSYCH: mentation appears normal, affect normal/bright      ASSESSMENT/PLAN:   (Z00.00) Preventative health care  (primary encounter diagnosis)  Comment: labs and mammogram done at OB/GYN  LDL is 166 but cardiovascular risk is low. Will recheck in  1 year   Plan:     (I47.1) Paroxysmal supraventricular tachycardia (H)  Comment: under good control   Plan:      Patient has been advised of split billing requirements and indicates understanding: Yes    COUNSELING:  Reviewed preventive health counseling, as reflected in patient instructions       Regular exercise       Healthy diet/nutrition    Estimated body mass index is 28.51 kg/m  " "as calculated from the following:    Height as of this encounter: 1.702 m (5' 7\").    Weight as of this encounter: 82.6 kg (182 lb).    Weight management plan: Discussed healthy diet and exercise guidelines    She reports that she has never smoked. She has never used smokeless tobacco.      Counseling Resources:  ATP IV Guidelines  Pooled Cohorts Equation Calculator  Breast Cancer Risk Calculator  BRCA-Related Cancer Risk Assessment: FHS-7 Tool  FRAX Risk Assessment  ICSI Preventive Guidelines  Dietary Guidelines for Americans, 2010  USDA's MyPlate  ASA Prophylaxis  Lung CA Screening    Madison Cabral MD  [facility]  "

## 2022-10-22 ENCOUNTER — HEALTH MAINTENANCE LETTER (OUTPATIENT)
Age: 57
End: 2022-10-22

## 2023-01-31 ENCOUNTER — OFFICE VISIT (OUTPATIENT)
Dept: NEUROLOGY | Facility: CLINIC | Age: 58
End: 2023-01-31
Payer: COMMERCIAL

## 2023-01-31 VITALS
SYSTOLIC BLOOD PRESSURE: 147 MMHG | HEIGHT: 67 IN | DIASTOLIC BLOOD PRESSURE: 89 MMHG | WEIGHT: 177 LBS | TEMPERATURE: 98.1 F | HEART RATE: 67 BPM | BODY MASS INDEX: 27.78 KG/M2

## 2023-01-31 DIAGNOSIS — G40.309 GENERALIZED CONVULSIVE EPILEPSY (H): ICD-10-CM

## 2023-01-31 LAB
CARBAMAZEPINE SERPL-MCNC: 9 UG/ML (ref 4–12)
SODIUM SERPL-SCNC: 138 MMOL/L (ref 136–145)

## 2023-01-31 PROCEDURE — 80156 ASSAY CARBAMAZEPINE TOTAL: CPT | Performed by: PSYCHIATRY & NEUROLOGY

## 2023-01-31 PROCEDURE — 84295 ASSAY OF SERUM SODIUM: CPT | Performed by: PSYCHIATRY & NEUROLOGY

## 2023-01-31 RX ORDER — CARBAMAZEPINE 100 MG/1
CAPSULE, EXTENDED RELEASE ORAL
Qty: 540 CAPSULE | Refills: 3 | Status: SHIPPED | OUTPATIENT
Start: 2023-01-31 | End: 2024-02-04

## 2023-01-31 NOTE — PROGRESS NOTES
[facility] Progress Note      Patient:  Marisela Beaevr  :  1965   Age:  57 year old   Today's Office Visit:  2023    Epilepsy Data:               Seizure Record  Current Visit Date: 23  Previous Visit Date: 22  Months since last visit: 12.58  Seizure Type 1: Tonic-clonic seizures  Description of Sz Type 1: convulsions  # of Type 1 Seizure since last visit: 0  Freq. Type 1 / Month: 0    Background History:  Right frontal meningioma resection . Two probable BTC seizures , approx one year following resection. Treated with carbamazepine 700 mg/d with therapeutic levels since. Serial MRI's last  without recurrence. EEGs with intermittent right frontal slowing, possible right frontal IEDs in one study. EEG 2020 normal. MRI Dec 2021 right frontal encephalomalacia, no recurrence of meningioma.    History of Present Illness:     No seizures. Last seizure . She has had a total of two BTC seizures. Last visit we discussed potentially discontinuing AEDs at this point. She was insecure about this, wanted to check BMD.  We did agree on reducing carbamazepine from 700 mg per day to 600 mg per day which she has tolerated well.    Current Outpatient Medications   Medication Sig Dispense Refill     carBAMazepine (CARBATROL) 100 MG 12 hr capsule TAKE 3 CAPSULES BY MOUTH IN THE MORNING AND 4 CAPSULES  BY MOUTH IN THE EVENING (Patient taking differently: TAKE 3 CAPSULES BY MOUTH IN THE MORNING AND 3 CAPSULES  BY MOUTH IN THE EVENING) 630 capsule 3     IBUPROFEN PO Take  by mouth as needed.       verapamil (CALAN) 80 MG tablet Take 1 tablet (80 mg) by mouth 3 times daily as needed 5 tablet 3        Perceived AED Side Effects:  No    Medication Notes:    Taking carbamazepine ER (100 mg) 300 mg twice a day.  No side effects  AED Medication Compliance:  compliant all of the time  Using a pill box:  Yes    Review of Systems:  No vomiting, diarrhea, fevers, hematuria  "or kidney stones.  Have you experienced a traumatic fall since your last visit: NO  Are these falls related to your seizures:  Not Applicable      Other Issues:    Had DEXA scan with good results. Left femoral neck t = -0.2. right remoral neck t = +0.2. This report is in our system.  Presents with report of DEXA 3/13/2014 (49 yo) with total hip t score +0.7, femoral neck t score +0.5. Side of determination not specified.  Is patient safe to drive:  No    Woman Care:   Patient is:  Likely postmenopausal. She had uterine ablation and has not menstruated since then.    Exam:    BP (!) 147/89   Pulse 67   Temp 98.1  F (36.7  C) (Temporal)   Ht 5' 7\" (170.2 cm)   Wt 177 lb (80.3 kg)   BMI 27.72 kg/m       Wt Readings from Last 5 Encounters:   01/31/23 177 lb (80.3 kg)   08/01/22 182 lb (82.6 kg)   12/07/21 184 lb 6.4 oz (83.6 kg)   09/21/21 183 lb (83 kg)   04/14/21 185 lb (83.9 kg)     Normal gait including tandem. Visual fields full. Extraocular movements intact without nystagmus. Smile symmetrical. Facial sensation equal. Tongue midline and strong. No drift, pronation, or tremor. Reflexes symmetrical. Finger finger nose is done well. Mental status grossly intact without evidence of overt depression or anxiety.    IMPRESSION  1) Status post resection right frontal meningioma 2007. No recurrence. Some right frontal encephalomalacia. Last scan Dec 2021, penultimate scan 2017.  2) Two seizures about one year after resection. No subsequent seizures. Not clear she requires continued AED treatment. Lamberink model suggests 20-30% risk but this may be an underestimate. Leve 12.1 with carbamazepine 700 mg/d. No seizures with carbamazepine 600 mg per day. DEXA shows good results with BMD essentially at density of young person. Probable decline over about 8 years tho very difficult to say given lack of actual measures, differing machines etc but overall not likely that decline any more than would be expected with age " alone.     DISCUSSION  Above again discussed. We discussed transitioning to lamotrigine or lacosamide and what this would entail. We discussed discontinuation. She again did not want to stop AEDs or make significant changes.  She was open to reducing carbamazepine further if level is high but did not want to stop medication.     PLAN:  1) Continue carbamazepine 300 mg twice a day.  2) carbamazepine level, sodium today.  3) Follow up one year in person.     Total time in person today 26 min. Additional 6 min reviewing chart prior to visit. Additional 5 min generating note and coordinating care following visit. So total of 37 min, all on day of visit. Reviewed two separate DEXA scan reports.    Jaspreet Camarena MD

## 2023-01-31 NOTE — LETTER
2023       RE: Marisela Beaver  : 1965   MRN: [id number]      Dear Colleague,    Thank you for referring your patient, Marisela Beaver, to the [facility] at [facility]. Please see a copy of my visit note below.    [facility] Progress Note      Patient:  Marisela Beaver  :  1965   Age:  57 year old   Today's Office Visit:  2023    Epilepsy Data:               Seizure Record  Current Visit Date: 23  Previous Visit Date: 22  Months since last visit: 12.58  Seizure Type 1: Tonic-clonic seizures  Description of Sz Type 1: convulsions  # of Type 1 Seizure since last visit: 0  Freq. Type 1 / Month: 0    Background History:  Right frontal meningioma resection . Two probable BTC seizures , approx one year following resection. Treated with carbamazepine 700 mg/d with therapeutic levels since. Serial MRI's last  without recurrence. EEGs with intermittent right frontal slowing, possible right frontal IEDs in one study. EEG 2020 normal. MRI Dec 2021 right frontal encephalomalacia, no recurrence of meningioma.    History of Present Illness:     No seizures. Last seizure . She has had a total of two BTC seizures. Last visit we discussed potentially discontinuing AEDs at this point. She was insecure about this, wanted to check BMD.  We did agree on reducing carbamazepine from 700 mg per day to 600 mg per day which she has tolerated well.    Current Outpatient Medications   Medication Sig Dispense Refill     carBAMazepine (CARBATROL) 100 MG 12 hr capsule TAKE 3 CAPSULES BY MOUTH IN THE MORNING AND 4 CAPSULES  BY MOUTH IN THE EVENING (Patient taking differently: TAKE 3 CAPSULES BY MOUTH IN THE MORNING AND 3 CAPSULES  BY MOUTH IN THE EVENING) 630 capsule 3     IBUPROFEN PO Take  by mouth as needed.       verapamil (CALAN) 80 MG tablet Take 1 tablet (80 mg) by mouth 3 times daily as  "needed 5 tablet 3        Perceived AED Side Effects:  No    Medication Notes:    Taking carbamazepine ER (100 mg) 300 mg twice a day.  No side effects  AED Medication Compliance:  compliant all of the time  Using a pill box:  Yes    Review of Systems:  No vomiting, diarrhea, fevers, hematuria or kidney stones.  Have you experienced a traumatic fall since your last visit: NO  Are these falls related to your seizures:  Not Applicable      Other Issues:    Had DEXA scan with good results. Left femoral neck t = -0.2. right remoral neck t = +0.2. This report is in our system.  Presents with report of DEXA 3/13/2014 (47 yo) with total hip t score +0.7, femoral neck t score +0.5. Side of determination not specified.  Is patient safe to drive:  No    Woman Care:   Patient is:  Likely postmenopausal. She had uterine ablation and has not menstruated since then.    Exam:    BP (!) 147/89   Pulse 67   Temp 98.1  F (36.7  C) (Temporal)   Ht 5' 7\" (170.2 cm)   Wt 177 lb (80.3 kg)   BMI 27.72 kg/m       Wt Readings from Last 5 Encounters:   01/31/23 177 lb (80.3 kg)   08/01/22 182 lb (82.6 kg)   12/07/21 184 lb 6.4 oz (83.6 kg)   09/21/21 183 lb (83 kg)   04/14/21 185 lb (83.9 kg)     Normal gait including tandem. Visual fields full. Extraocular movements intact without nystagmus. Smile symmetrical. Facial sensation equal. Tongue midline and strong. No drift, pronation, or tremor. Reflexes symmetrical. Finger finger nose is done well. Mental status grossly intact without evidence of overt depression or anxiety.    IMPRESSION  1) Status post resection right frontal meningioma 2007. No recurrence. Some right frontal encephalomalacia. Last scan Dec 2021, penultimate scan 2017.  2) Two seizures about one year after resection. No subsequent seizures. Not clear she requires continued AED treatment. Lamberink model suggests 20-30% risk but this may be an underestimate. Leve 12.1 with carbamazepine 700 mg/d. No seizures with " carbamazepine 600 mg per day. DEXA shows good results with BMD essentially at density of young person. Probable decline over about 8 years tho very difficult to say given lack of actual measures, differing machines etc but overall not likely that decline any more than would be expected with age alone.     DISCUSSION  Above again discussed. We discussed transitioning to lamotrigine or lacosamide and what this would entail. We discussed discontinuation. She again did not want to stop AEDs or make significant changes.  She was open to reducing carbamazepine further if level is high but did not want to stop medication.     PLAN:  1) Continue carbamazepine 300 mg twice a day.  2) carbamazepine level, sodium today.  3) Follow up one year in person.     Total time in person today 26 min. Additional 6 min reviewing chart prior to visit. Additional 5 min generating note and coordinating care following visit. So total of 37 min, all on day of visit. Reviewed two separate DEXA scan reports.    Jaspreet Camarena MD

## 2023-05-10 NOTE — PROGRESS NOTES
30 day Event monitor placed. All questions answered.   Area L Indication Text: Tumors in this location are included in Area L (trunk and extremities).  Mohs surgery is indicated for larger tumors, or tumors with aggressive histologic features, in these anatomic locations.

## 2023-06-18 ENCOUNTER — HEALTH MAINTENANCE LETTER (OUTPATIENT)
Age: 58
End: 2023-06-18

## 2023-11-05 ENCOUNTER — HEALTH MAINTENANCE LETTER (OUTPATIENT)
Age: 58
End: 2023-11-05

## 2023-11-26 ASSESSMENT — ENCOUNTER SYMPTOMS
SORE THROAT: 0
HEMATOCHEZIA: 0
HEMATURIA: 0
WEAKNESS: 0
CONSTIPATION: 0
EYE PAIN: 0
HEARTBURN: 0
ARTHRALGIAS: 0
NERVOUS/ANXIOUS: 0
PALPITATIONS: 0
DIARRHEA: 0
DYSURIA: 0
DIZZINESS: 0
FREQUENCY: 0
MYALGIAS: 0
COUGH: 0
HEADACHES: 0
FEVER: 0
NAUSEA: 0
JOINT SWELLING: 0
CHILLS: 0
ABDOMINAL PAIN: 0
BREAST MASS: 0
SHORTNESS OF BREATH: 0
PARESTHESIAS: 0

## 2023-11-27 ENCOUNTER — OFFICE VISIT (OUTPATIENT)
Dept: INTERNAL MEDICINE | Facility: CLINIC | Age: 58
End: 2023-11-27
Payer: COMMERCIAL

## 2023-11-27 VITALS
OXYGEN SATURATION: 96 % | BODY MASS INDEX: 27.62 KG/M2 | TEMPERATURE: 97.7 F | SYSTOLIC BLOOD PRESSURE: 130 MMHG | WEIGHT: 176 LBS | HEART RATE: 69 BPM | DIASTOLIC BLOOD PRESSURE: 85 MMHG | HEIGHT: 67 IN | RESPIRATION RATE: 16 BRPM

## 2023-11-27 DIAGNOSIS — Z12.31 VISIT FOR SCREENING MAMMOGRAM: ICD-10-CM

## 2023-11-27 DIAGNOSIS — Z00.00 PREVENTATIVE HEALTH CARE: Primary | ICD-10-CM

## 2023-11-27 DIAGNOSIS — I47.10 PAROXYSMAL SUPRAVENTRICULAR TACHYCARDIA (H): ICD-10-CM

## 2023-11-27 DIAGNOSIS — I47.10 SVT (SUPRAVENTRICULAR TACHYCARDIA) (H): ICD-10-CM

## 2023-11-27 DIAGNOSIS — Z11.4 SCREENING FOR HIV (HUMAN IMMUNODEFICIENCY VIRUS): ICD-10-CM

## 2023-11-27 LAB
ALBUMIN SERPL BCG-MCNC: 4.4 G/DL (ref 3.5–5.2)
ALP SERPL-CCNC: 99 U/L (ref 40–150)
ALT SERPL W P-5'-P-CCNC: 23 U/L (ref 0–50)
ANION GAP SERPL CALCULATED.3IONS-SCNC: 10 MMOL/L (ref 7–15)
AST SERPL W P-5'-P-CCNC: 31 U/L (ref 0–45)
BASOPHILS # BLD AUTO: 0 10E3/UL (ref 0–0.2)
BASOPHILS NFR BLD AUTO: 1 %
BILIRUB SERPL-MCNC: 0.4 MG/DL
BUN SERPL-MCNC: 15.8 MG/DL (ref 6–20)
CALCIUM SERPL-MCNC: 9.4 MG/DL (ref 8.6–10)
CHLORIDE SERPL-SCNC: 104 MMOL/L (ref 98–107)
CREAT SERPL-MCNC: 0.72 MG/DL (ref 0.51–0.95)
DEPRECATED HCO3 PLAS-SCNC: 26 MMOL/L (ref 22–29)
EGFRCR SERPLBLD CKD-EPI 2021: >90 ML/MIN/1.73M2
EOSINOPHIL # BLD AUTO: 0.1 10E3/UL (ref 0–0.7)
EOSINOPHIL NFR BLD AUTO: 2 %
ERYTHROCYTE [DISTWIDTH] IN BLOOD BY AUTOMATED COUNT: 11.6 % (ref 10–15)
GLUCOSE SERPL-MCNC: 120 MG/DL (ref 70–99)
HCT VFR BLD AUTO: 37 % (ref 35–47)
HGB BLD-MCNC: 13.1 G/DL (ref 11.7–15.7)
HIV 1+2 AB+HIV1 P24 AG SERPL QL IA: NONREACTIVE
IMM GRANULOCYTES # BLD: 0 10E3/UL
IMM GRANULOCYTES NFR BLD: 0 %
LYMPHOCYTES # BLD AUTO: 1.1 10E3/UL (ref 0.8–5.3)
LYMPHOCYTES NFR BLD AUTO: 31 %
MCH RBC QN AUTO: 34.1 PG (ref 26.5–33)
MCHC RBC AUTO-ENTMCNC: 35.4 G/DL (ref 31.5–36.5)
MCV RBC AUTO: 96 FL (ref 78–100)
MONOCYTES # BLD AUTO: 0.4 10E3/UL (ref 0–1.3)
MONOCYTES NFR BLD AUTO: 11 %
NEUTROPHILS # BLD AUTO: 1.9 10E3/UL (ref 1.6–8.3)
NEUTROPHILS NFR BLD AUTO: 56 %
PLATELET # BLD AUTO: 143 10E3/UL (ref 150–450)
POTASSIUM SERPL-SCNC: 4.1 MMOL/L (ref 3.4–5.3)
PROT SERPL-MCNC: 7.7 G/DL (ref 6.4–8.3)
RBC # BLD AUTO: 3.84 10E6/UL (ref 3.8–5.2)
SODIUM SERPL-SCNC: 140 MMOL/L (ref 135–145)
WBC # BLD AUTO: 3.4 10E3/UL (ref 4–11)

## 2023-11-27 PROCEDURE — 80053 COMPREHEN METABOLIC PANEL: CPT | Performed by: INTERNAL MEDICINE

## 2023-11-27 PROCEDURE — 85025 COMPLETE CBC W/AUTO DIFF WBC: CPT | Performed by: INTERNAL MEDICINE

## 2023-11-27 PROCEDURE — 99396 PREV VISIT EST AGE 40-64: CPT | Performed by: INTERNAL MEDICINE

## 2023-11-27 PROCEDURE — 36415 COLL VENOUS BLD VENIPUNCTURE: CPT | Performed by: INTERNAL MEDICINE

## 2023-11-27 PROCEDURE — 87389 HIV-1 AG W/HIV-1&-2 AB AG IA: CPT | Performed by: INTERNAL MEDICINE

## 2023-11-27 PROCEDURE — 83036 HEMOGLOBIN GLYCOSYLATED A1C: CPT | Performed by: INTERNAL MEDICINE

## 2023-11-27 ASSESSMENT — ENCOUNTER SYMPTOMS
NERVOUS/ANXIOUS: 0
CONSTIPATION: 0
DIZZINESS: 0
HEMATURIA: 0
HEARTBURN: 0
PARESTHESIAS: 0
SHORTNESS OF BREATH: 0
EYE PAIN: 0
FREQUENCY: 0
DIARRHEA: 0
COUGH: 0
PALPITATIONS: 0
JOINT SWELLING: 0
DYSURIA: 0
ABDOMINAL PAIN: 0
NAUSEA: 0
MYALGIAS: 0
ARTHRALGIAS: 0
WEAKNESS: 0
SORE THROAT: 0
CHILLS: 0
BREAST MASS: 0
FEVER: 0
HEMATOCHEZIA: 0
HEADACHES: 0

## 2023-11-27 NOTE — PROGRESS NOTES
SUBJECTIVE:   Marisela is a 58 year old, presenting for the following:    Fasting. See labs pateint brought from 6- GYN provider. Physical        11/27/2023     6:53 AM   Additional Questions   Roomed by CORA Almaguer LPN   Accompanied by self         11/27/2023     6:53 AM   Patient Reported Additional Medications   Patient reports taking the following new medications none       Healthy Habits:     Getting at least 3 servings of Calcium per day:  Yes    Bi-annual eye exam:  Yes    Dental care twice a year:  Yes    Sleep apnea or symptoms of sleep apnea:  None    Diet:  Regular (no restrictions)    Frequency of exercise:  4-5 days/week    Duration of exercise:  45-60 minutes    Taking medications regularly:  Yes    Medication side effects:  None    Additional concerns today:  No        Social History     Tobacco Use    Smoking status: Never    Smokeless tobacco: Never   Substance Use Topics    Alcohol use: Yes     Alcohol/week: 3.0 standard drinks of alcohol     Types: 3 Standard drinks or equivalent per week     Comment: occ             11/26/2023     9:52 PM   Alcohol Use   Prescreen: >3 drinks/day or >7 drinks/week? No     Reviewed orders with patient.  Reviewed health maintenance and updated orders accordingly - Yes  BP Readings from Last 3 Encounters:   11/27/23 130/85   01/31/23 (!) 147/89   08/01/22 138/86    Wt Readings from Last 3 Encounters:   11/27/23 79.8 kg (176 lb)   01/31/23 80.3 kg (177 lb)   08/01/22 82.6 kg (182 lb)                    Breast Cancer Screening:    FHS-7:       7/31/2022     9:15 PM 11/26/2023     9:55 PM   Breast CA Risk Assessment (FHS-7)   Did any of your first-degree relatives have breast or ovarian cancer? Yes Yes   Did any of your relatives have bilateral breast cancer? No No   Did any man in your family have breast cancer? No No   Did any woman in your family have breast and ovarian cancer? No No   Did any woman in your family have breast cancer before age 50 y? No    Do  "you have 2 or more relatives with breast and/or ovarian cancer? Yes Yes   Do you have 2 or more relatives with breast and/or bowel cancer? Yes Yes       Mammogram Screening: Recommended mammography every 1-2 years with patient discussion and risk factor consideration  Pertinent mammograms are reviewed under the imaging tab.    History of abnormal Pap smear: NO - age 30-65 PAP every 5 years with negative HPV co-testing recommended      6/17/2020    12:00 AM 3/14/2014    12:00 AM   PAP / HPV   PAP (Historical)  normal       PAP-ABSTRACT See Scanned Document            This result is from an external source.     Reviewed and updated as needed this visit by clinical staff   Tobacco  Allergies  Meds  Problems  Med Hx  Surg Hx  Fam Hx          Reviewed and updated as needed this visit by Provider                     Review of Systems   Constitutional:  Negative for chills and fever.   HENT:  Negative for congestion, ear pain, hearing loss and sore throat.    Eyes:  Negative for pain and visual disturbance.   Respiratory:  Negative for cough and shortness of breath.    Cardiovascular:  Negative for chest pain, palpitations and peripheral edema.   Gastrointestinal:  Negative for abdominal pain, constipation, diarrhea, heartburn, hematochezia and nausea.   Breasts:  Negative for tenderness, breast mass and discharge.   Genitourinary:  Negative for dysuria, frequency, genital sores, hematuria, pelvic pain, urgency, vaginal bleeding and vaginal discharge.   Musculoskeletal:  Negative for arthralgias, joint swelling and myalgias.   Skin:  Negative for rash.   Neurological:  Negative for dizziness, weakness, headaches and paresthesias.   Psychiatric/Behavioral:  Negative for mood changes. The patient is not nervous/anxious.           OBJECTIVE:   /85   Pulse 69   Temp 97.7  F (36.5  C) (Oral)   Resp 16   Ht 1.702 m (5' 7\")   Wt 79.8 kg (176 lb)   LMP  (LMP Unknown)   SpO2 96%   Breastfeeding No   BMI 27.57 " "kg/m    Physical Exam  GENERAL: healthy, alert and no distress  EYES: Eyes grossly normal to inspection, PERRL and conjunctivae and sclerae normal  HENT: ear canals and TM's normal, nose and mouth without ulcers or lesions  NECK: no adenopathy, no asymmetry, masses, or scars and thyroid normal to palpation  RESP: lungs clear to auscultation - no rales, rhonchi or wheezes  CV: regular rate and rhythm, normal S1 S2, no S3 or S4, no murmur, click or rub, no peripheral edema and peripheral pulses strong  ABDOMEN: soft, nontender, no hepatosplenomegaly, no masses and bowel sounds normal  MS: no gross musculoskeletal defects noted, no edema  SKIN: no suspicious lesions or rashes  NEURO: Normal strength and tone, mentation intact and speech normal  PSYCH: mentation appears normal, affect normal/bright      ASSESSMENT/PLAN:   (Z00.00) Preventative health care  (primary encounter diagnosis)  Comment:    Plan: CBC with platelets and differential,         Comprehensive metabolic panel (BMP + Alb, Alk         Phos, ALT, AST, Total. Bili, TP)             (Z11.4) Screening for HIV (human immunodeficiency virus)  Comment:    Plan: HIV Antigen Antibody Combo             (Z12.31) Visit for screening mammogram  Comment:    Plan: MA SCREENING DIGITAL BILAT - Future  (s+30)             (I47.10) Paroxysmal supraventricular tachycardia  Comment: stable pattern  Plan:      Patient has been advised of split billing requirements and indicates understanding: Yes      COUNSELING:  Reviewed preventive health counseling, as reflected in patient instructions       Regular exercise       Healthy diet/nutrition      BMI:   Estimated body mass index is 27.57 kg/m  as calculated from the following:    Height as of this encounter: 1.702 m (5' 7\").    Weight as of this encounter: 79.8 kg (176 lb).         She reports that she has never smoked. She has never used smokeless tobacco.          Madison Cabral MD  [facility]  "

## 2023-11-28 LAB — HBA1C MFR BLD: 5.5 % (ref 0–5.6)

## 2024-01-25 ENCOUNTER — PATIENT OUTREACH (OUTPATIENT)
Dept: CARE COORDINATION | Facility: CLINIC | Age: 59
End: 2024-01-25
Payer: COMMERCIAL

## 2024-02-04 DIAGNOSIS — G40.309 GENERALIZED CONVULSIVE EPILEPSY (H): ICD-10-CM

## 2024-02-04 RX ORDER — CARBAMAZEPINE 100 MG/1
CAPSULE, EXTENDED RELEASE ORAL
Qty: 540 CAPSULE | Refills: 1 | Status: SHIPPED | OUTPATIENT
Start: 2024-02-04 | End: 2024-03-12

## 2024-02-04 NOTE — TELEPHONE ENCOUNTER
carBAMazepine (CARBATROL) 100 MG 12 hr capsule   540 capsule 3 1/31/2023 1/31/2023  [facility]    Jaspreet Camarena MD  Neurology    Nv:  3/12/24    Aed  1/31/23  Cbc   11/27/23  NA     11/27/23

## 2024-02-22 ENCOUNTER — PATIENT OUTREACH (OUTPATIENT)
Dept: CARE COORDINATION | Facility: CLINIC | Age: 59
End: 2024-02-22
Payer: COMMERCIAL

## 2024-03-06 DIAGNOSIS — G40.309 GENERALIZED CONVULSIVE EPILEPSY (H): ICD-10-CM

## 2024-03-06 RX ORDER — CARBAMAZEPINE 100 MG/1
CAPSULE, EXTENDED RELEASE ORAL
Qty: 540 CAPSULE | Refills: 1 | Status: CANCELLED | OUTPATIENT
Start: 2024-03-06

## 2024-03-08 NOTE — TELEPHONE ENCOUNTER
What is the concern that needs to be addressed by a nurse? Pt following up on refill request. Wanted to change the Pharmacy to [facility] Phone- [phone]  Fax- [phone] from [employer]     May a detailed message be left on voicemail? Yes    Date of last office visit: 1-31-23    Message routed to: Refill Team

## 2024-03-12 ENCOUNTER — OFFICE VISIT (OUTPATIENT)
Dept: NEUROLOGY | Facility: CLINIC | Age: 59
End: 2024-03-12
Payer: COMMERCIAL

## 2024-03-12 VITALS
DIASTOLIC BLOOD PRESSURE: 91 MMHG | BODY MASS INDEX: 27.69 KG/M2 | TEMPERATURE: 97.2 F | WEIGHT: 176.8 LBS | HEART RATE: 65 BPM | SYSTOLIC BLOOD PRESSURE: 155 MMHG | OXYGEN SATURATION: 98 %

## 2024-03-12 DIAGNOSIS — G40.309 GENERALIZED CONVULSIVE EPILEPSY (H): ICD-10-CM

## 2024-03-12 PROCEDURE — 80156 ASSAY CARBAMAZEPINE TOTAL: CPT | Mod: ORL | Performed by: PSYCHIATRY & NEUROLOGY

## 2024-03-12 RX ORDER — CARBAMAZEPINE 100 MG/1
CAPSULE, EXTENDED RELEASE ORAL
Qty: 460 CAPSULE | Refills: 3 | Status: SHIPPED | OUTPATIENT
Start: 2024-03-12 | End: 2024-03-13

## 2024-03-12 ASSESSMENT — PAIN SCALES - GENERAL: PAINLEVEL: NO PAIN (0)

## 2024-03-12 NOTE — LETTER
3/12/2024       RE: Marisela Beaver  : 1965   MRN: [id number]      Dear Colleague,    Thank you for referring your patient, Marisela Beaver, to the [facility] EPILEPSY CARE at [facility]. Please see a copy of my visit note below.    [facility] Epilepsy Care Progress Note      Patient:  Marisela Beaver  :  1965   Age:  58 year old   Today's Office Visit:  3/12/2024    Epilepsy Data:    Seizure Record  Current Visit Date: 24  Previous Visit Date: 23  Months since last visit: 13.34  Seizure Type 1: Tonic-clonic seizures  Description of Sz Type 1: convulsions  # of Type 1 Seizure since last visit: 0  Freq. Type 1 / Month: 0    Background History:  Right frontal meningioma resection . Two probable BTC seizures , approx one year following resection. Treated with carbamazepine 700 mg/d with therapeutic levels since. Serial MRI's last  without recurrence. EEGs with intermittent right frontal slowing, possible right frontal IEDs in one study. EEG 2020 normal. MRI Dec 2021 right frontal encephalomalacia, no recurrence of meningioma.    History of Present Illness:     No seizures since last visit; last seizure occurred . We had reduced carbamazepine from 800 mg/d to 700 mg/d 2023.    Current Outpatient Medications   Medication Sig Dispense Refill    carBAMazepine (CARBATROL) 100 MG 12 hr capsule TAKE 3 CAPSULES BY MOUTH IN THE  MORNING AND 3 CAPSULES BY MOUTH  IN THE EVENING. Please keep appt 3/12/24. 540 capsule 1    IBUPROFEN PO Take  by mouth as needed.          Perceived AED Side Effects:  No    Medication Notes:    No side effects    AED Medication Compliance:  compliant all of the time  Using a pill box:  Yes    Review of Systems:  No vomiting, diarrhea, fevers, hematuria or kidney stones.  Have you experienced a traumatic fall since your last visit: NO  Are these falls related to your  seizures:  Not Applicable    Other Issues:    No other medical issues.  She remains very physically active. Denies symptoms of depression.  Is patient safe to drive:  Yes    Woman Care:   Patient is:  Post menopausal. DEXA reviewed at last visit showed normal BMD for age.    Exam:    BP (!) 155/91 (BP Location: Right arm, Patient Position: Sitting, Cuff Size: Adult Large)   Pulse 65   Temp 97.2  F (36.2  C) (Temporal)   Wt 176 lb 12.8 oz (80.2 kg)   SpO2 98%   BMI 27.69 kg/m       Wt Readings from Last 5 Encounters:   03/12/24 176 lb 12.8 oz (80.2 kg)   11/27/23 176 lb (79.8 kg)   01/31/23 177 lb (80.3 kg)   08/01/22 182 lb (82.6 kg)   12/07/21 184 lb 6.4 oz (83.6 kg)     Normal gait including tandem. One foot stand normal. Visual fields full. Extraocular movements intact without nystagmus. Smile symmetrical. Facial sensation equal. Tongue midline and strong. No drift, pronation, or tremor. Reflexes symmetrical including ankle jerks. Finger finger nose is done well. Mental status grossly intact without evidence of overt depression or anxiety. Strength full proximally and distally in legs. No grasp reflexes.     Latest Reference Range & Units 01/31/23 08:45   Carbamazepine 4.0 - 12.0 ug/mL 9.0     Above level on 600 mg/d.    IMPRESSION  1) Status post resection right frontal meningioma 2007. No recurrence. Some right frontal encephalomalacia. Last scan Dec 2021, penultimate scan 2017.  2) Two seizures about one year after resection. No subsequent seizures. Not clear she requires continued AED treatment. Lamberink model suggests 20-30% risk but this may be an underestimate.  No seizures with carbamazepine 600 mg per day. DEXA shows good results with BMD essentially at density of young person. Probable decline over about 8 years tho very difficult to say given lack of actual measures, differing machines etc but overall not likely that decline any more than would be expected with age alone.     DISCUSSION  Above  again discussed. We discussed transitioning to lamotrigine or lacosamide and what this would entail. We discussed discontinuation. She again did not want to stop AEDs but was open to minor reduction. We discussed risks of discontinuation, namely BTC which could result in significant trauma, pneumonia, fracture and there is even a small possibility of SUDEP, Benefits would be decreased AED burden with potential decreased risk of future fractures or atherosclerosis. After considering risks and benefits she was open to reducing carbamazepine by another 100 mg.     PLAN:  1) Reduce  carbamazepine to 200 mg in AM and 300 mg in PM.  2) carbamazepine level, sodium today.  3) Follow up one year in person. Call if any seizures.  4) Consider repeat MRI in one to two years; consider curbsiding NS on how frequently this should be done.     Total time in person today 26 min. Additional 6 min reviewing chart prior to visit. Additional 5 min generating note and coordinating care following visit. So total of 37 min, all on day of visit. The longitudinal plan of care for this patient's epilepsy (including epilepsy comorbidities if appropriate) was addressed during this visit. Due to the added complexity in care, I will continue to support this patient in the subsequent management of this condition(s) and with the ongoing continuity of care of this condition(s).           Again, thank you for allowing me to participate in the care of your patient.      Sincerely,    Jaspreet Camarena MD

## 2024-03-12 NOTE — PATIENT INSTRUCTIONS
"Today we reduced your carbamazepine extended release.    We are continuing with the 100 mg tablet.  Take two in the morning and three in the evening.    We also checked blood levels.    Rx went to [facility]. If you need a \"bridge Rx\" for several days while you're waiting for the [facility] delivery give us a call at [phone].  "

## 2024-03-12 NOTE — PROGRESS NOTES
[facility] Epilepsy Care Progress Note      Patient:  Marisela Beaver  :  1965   Age:  58 year old   Today's Office Visit:  3/12/2024    Epilepsy Data:    Seizure Record  Current Visit Date: 24  Previous Visit Date: 23  Months since last visit: 13.34  Seizure Type 1: Tonic-clonic seizures  Description of Sz Type 1: convulsions  # of Type 1 Seizure since last visit: 0  Freq. Type 1 / Month: 0    Background History:  Right frontal meningioma resection . Two probable BTC seizures , approx one year following resection. Treated with carbamazepine 700 mg/d with therapeutic levels since. Serial MRI's last  without recurrence. EEGs with intermittent right frontal slowing, possible right frontal IEDs in one study. EEG 2020 normal. MRI Dec 2021 right frontal encephalomalacia, no recurrence of meningioma.    History of Present Illness:     No seizures since last visit; last seizure occurred . We had reduced carbamazepine from 800 mg/d to 700 mg/d 2023.    Current Outpatient Medications   Medication Sig Dispense Refill    carBAMazepine (CARBATROL) 100 MG 12 hr capsule TAKE 3 CAPSULES BY MOUTH IN THE  MORNING AND 3 CAPSULES BY MOUTH  IN THE EVENING. Please keep appt 3/12/24. 540 capsule 1    IBUPROFEN PO Take  by mouth as needed.          Perceived AED Side Effects:  No    Medication Notes:    No side effects    AED Medication Compliance:  compliant all of the time  Using a pill box:  Yes    Review of Systems:  No vomiting, diarrhea, fevers, hematuria or kidney stones.  Have you experienced a traumatic fall since your last visit: NO  Are these falls related to your seizures:  Not Applicable    Other Issues:    No other medical issues.  She remains very physically active. Denies symptoms of depression.  Is patient safe to drive:  Yes    Woman Care:   Patient is:  Post menopausal. DEXA reviewed at last visit showed normal BMD for age.    Exam:    BP (!) 155/91 (BP  Location: Right arm, Patient Position: Sitting, Cuff Size: Adult Large)   Pulse 65   Temp 97.2  F (36.2  C) (Temporal)   Wt 176 lb 12.8 oz (80.2 kg)   SpO2 98%   BMI 27.69 kg/m       Wt Readings from Last 5 Encounters:   03/12/24 176 lb 12.8 oz (80.2 kg)   11/27/23 176 lb (79.8 kg)   01/31/23 177 lb (80.3 kg)   08/01/22 182 lb (82.6 kg)   12/07/21 184 lb 6.4 oz (83.6 kg)     Normal gait including tandem. One foot stand normal. Visual fields full. Extraocular movements intact without nystagmus. Smile symmetrical. Facial sensation equal. Tongue midline and strong. No drift, pronation, or tremor. Reflexes symmetrical including ankle jerks. Finger finger nose is done well. Mental status grossly intact without evidence of overt depression or anxiety. Strength full proximally and distally in legs. No grasp reflexes.     Latest Reference Range & Units 01/31/23 08:45   Carbamazepine 4.0 - 12.0 ug/mL 9.0     Above level on 600 mg/d.    IMPRESSION  1) Status post resection right frontal meningioma 2007. No recurrence. Some right frontal encephalomalacia. Last scan Dec 2021, penultimate scan 2017.  2) Two seizures about one year after resection. No subsequent seizures. Not clear she requires continued AED treatment. Lamberink model suggests 20-30% risk but this may be an underestimate.  No seizures with carbamazepine 600 mg per day. DEXA shows good results with BMD essentially at density of young person. Probable decline over about 8 years tho very difficult to say given lack of actual measures, differing machines etc but overall not likely that decline any more than would be expected with age alone.     DISCUSSION  Above again discussed. We discussed transitioning to lamotrigine or lacosamide and what this would entail. We discussed discontinuation. She again did not want to stop AEDs but was open to minor reduction. We discussed risks of discontinuation, namely BTC which could result in significant trauma, pneumonia,  fracture and there is even a small possibility of SUDEP, Benefits would be decreased AED burden with potential decreased risk of future fractures or atherosclerosis. After considering risks and benefits she was open to reducing carbamazepine by another 100 mg.     PLAN:  1) Reduce  carbamazepine to 200 mg in AM and 300 mg in PM.  2) carbamazepine level, sodium today.  3) Follow up one year in person. Call if any seizures.  4) Consider repeat MRI in one to two years; consider curbsiding NS on how frequently this should be done.     Total time in person today 26 min. Additional 6 min reviewing chart prior to visit. Additional 5 min generating note and coordinating care following visit. So total of 37 min, all on day of visit. The longitudinal plan of care for this patient's epilepsy (including epilepsy comorbidities if appropriate) was addressed during this visit. Due to the added complexity in care, I will continue to support this patient in the subsequent management of this condition(s) and with the ongoing continuity of care of this condition(s).     Jaspreet Camarena MD

## 2024-03-13 ENCOUNTER — TELEPHONE (OUTPATIENT)
Dept: NEUROLOGY | Facility: CLINIC | Age: 59
End: 2024-03-13

## 2024-03-13 DIAGNOSIS — G40.309 GENERALIZED CONVULSIVE EPILEPSY (H): ICD-10-CM

## 2024-03-13 LAB — CARBAMAZEPINE SERPL-MCNC: 9.4 UG/ML (ref 4–12)

## 2024-03-13 RX ORDER — CARBAMAZEPINE 100 MG/1
CAPSULE, EXTENDED RELEASE ORAL
Qty: 460 CAPSULE | Refills: 3 | Status: SHIPPED | OUTPATIENT
Start: 2024-03-13

## 2024-03-13 NOTE — TELEPHONE ENCOUNTER
What is the concern that needs to be addressed by a nurse?     Marisela Saraanita Beaver is calling stating that she was advised by [facility] that clinician needs to call pharmacist to clarify prescription directions and details of carBAMazepine (CARBATROL) 100 MG 12 hr capsule patient provided a contact # of [phone] to speak with [facility] Clinician. Patient is concerned as she only has 6 days of medication left and is asking for a call back once this has been completed.    May a detailed message be left on [employer]? YES    Date of last office visit: 03/12/2024    Message routed to: MINCEP RN

## 2024-03-13 NOTE — TELEPHONE ENCOUNTER
Per office note Reduce carbamazepine to 200 mg in AM and 300 mg in PM.     Writer updated prescription as provider had left out the word morning in the instructions.     Writer called [facility] to confirm that they received updated prescription and they confirmed this. Writer called patient and updated her about this request.

## 2024-03-17 ENCOUNTER — MYC MEDICAL ADVICE (OUTPATIENT)
Dept: NEUROLOGY | Facility: CLINIC | Age: 59
End: 2024-03-17

## 2024-03-17 DIAGNOSIS — G40.309 GENERALIZED CONVULSIVE EPILEPSY (H): Primary | ICD-10-CM

## 2024-03-29 ENCOUNTER — LAB (OUTPATIENT)
Dept: LAB | Facility: CLINIC | Age: 59
End: 2024-03-29
Payer: COMMERCIAL

## 2024-03-29 DIAGNOSIS — G40.309 GENERALIZED CONVULSIVE EPILEPSY (H): ICD-10-CM

## 2024-03-29 LAB — SODIUM SERPL-SCNC: 140 MMOL/L (ref 135–145)

## 2024-03-29 PROCEDURE — 36415 COLL VENOUS BLD VENIPUNCTURE: CPT

## 2024-03-30 LAB — CARBAMAZEPINE SERPL-MCNC: 6.9 UG/ML (ref 4–12)

## 2024-12-02 SDOH — HEALTH STABILITY: PHYSICAL HEALTH: ON AVERAGE, HOW MANY DAYS PER WEEK DO YOU ENGAGE IN MODERATE TO STRENUOUS EXERCISE (LIKE A BRISK WALK)?: 4 DAYS

## 2024-12-02 SDOH — HEALTH STABILITY: PHYSICAL HEALTH: ON AVERAGE, HOW MANY MINUTES DO YOU ENGAGE IN EXERCISE AT THIS LEVEL?: 50 MIN

## 2024-12-02 ASSESSMENT — SOCIAL DETERMINANTS OF HEALTH (SDOH): HOW OFTEN DO YOU GET TOGETHER WITH FRIENDS OR RELATIVES?: TWICE A WEEK

## 2024-12-03 ENCOUNTER — OFFICE VISIT (OUTPATIENT)
Dept: INTERNAL MEDICINE | Facility: CLINIC | Age: 59
End: 2024-12-03
Payer: COMMERCIAL

## 2024-12-03 VITALS
TEMPERATURE: 97.2 F | DIASTOLIC BLOOD PRESSURE: 90 MMHG | HEIGHT: 67 IN | BODY MASS INDEX: 27.94 KG/M2 | HEART RATE: 74 BPM | OXYGEN SATURATION: 99 % | RESPIRATION RATE: 16 BRPM | SYSTOLIC BLOOD PRESSURE: 127 MMHG | WEIGHT: 178 LBS

## 2024-12-03 DIAGNOSIS — Z00.00 PREVENTATIVE HEALTH CARE: Primary | ICD-10-CM

## 2024-12-03 DIAGNOSIS — E05.90 THYROTOXICOSIS WITHOUT THYROID STORM, UNSPECIFIED THYROTOXICOSIS TYPE: ICD-10-CM

## 2024-12-03 DIAGNOSIS — E78.5 HYPERLIPIDEMIA, UNSPECIFIED HYPERLIPIDEMIA TYPE: ICD-10-CM

## 2024-12-03 LAB
BASOPHILS # BLD AUTO: 0 10E3/UL (ref 0–0.2)
BASOPHILS NFR BLD AUTO: 0 %
EOSINOPHIL # BLD AUTO: 0.1 10E3/UL (ref 0–0.7)
EOSINOPHIL NFR BLD AUTO: 2 %
ERYTHROCYTE [DISTWIDTH] IN BLOOD BY AUTOMATED COUNT: 11.6 % (ref 10–15)
HCT VFR BLD AUTO: 38.3 % (ref 35–47)
HGB BLD-MCNC: 13.5 G/DL (ref 11.7–15.7)
IMM GRANULOCYTES # BLD: 0 10E3/UL
IMM GRANULOCYTES NFR BLD: 0 %
LYMPHOCYTES # BLD AUTO: 1.2 10E3/UL (ref 0.8–5.3)
LYMPHOCYTES NFR BLD AUTO: 32 %
MCH RBC QN AUTO: 33.5 PG (ref 26.5–33)
MCHC RBC AUTO-ENTMCNC: 35.2 G/DL (ref 31.5–36.5)
MCV RBC AUTO: 95 FL (ref 78–100)
MONOCYTES # BLD AUTO: 0.4 10E3/UL (ref 0–1.3)
MONOCYTES NFR BLD AUTO: 9 %
NEUTROPHILS # BLD AUTO: 2.1 10E3/UL (ref 1.6–8.3)
NEUTROPHILS NFR BLD AUTO: 57 %
PLATELET # BLD AUTO: 164 10E3/UL (ref 150–450)
RBC # BLD AUTO: 4.03 10E6/UL (ref 3.8–5.2)
WBC # BLD AUTO: 3.7 10E3/UL (ref 4–11)

## 2024-12-03 PROCEDURE — 36415 COLL VENOUS BLD VENIPUNCTURE: CPT | Performed by: INTERNAL MEDICINE

## 2024-12-03 PROCEDURE — 85025 COMPLETE CBC W/AUTO DIFF WBC: CPT | Performed by: INTERNAL MEDICINE

## 2024-12-03 PROCEDURE — 84443 ASSAY THYROID STIM HORMONE: CPT | Performed by: INTERNAL MEDICINE

## 2024-12-03 PROCEDURE — 99396 PREV VISIT EST AGE 40-64: CPT | Performed by: INTERNAL MEDICINE

## 2024-12-03 NOTE — PROGRESS NOTES
"Preventive Care Visit  [facility]  Madison Cabral MD, Internal Medicine  Dec 3, 2024      Assessment & Plan     Preventative health care      - CBC with platelets and differential; Future  - CBC with platelets and differential    Hyperlipidemia   She does not want to start medication. Will work on it for 6-12 months and recheck    Thyrotoxicosis without thyroid storm, unspecified thyrotoxicosis type  Histrory of graves disease.   - TSH with free T4 reflex; Future  - TSH with free T4 reflex    Patient has been advised of split billing requirements and indicates understanding: Yes        BMI  Estimated body mass index is 27.88 kg/m  as calculated from the following:    Height as of this encounter: 1.702 m (5' 7\").    Weight as of this encounter: 80.7 kg (178 lb).       Counseling  Appropriate preventive services were addressed with this patient via screening, questionnaire, or discussion as appropriate for fall prevention, nutrition, physical activity, Tobacco-use cessation, social engagement, weight loss and cognition.  Checklist reviewing preventive services available has been given to the patient.  Reviewed patient's diet, addressing concerns and/or questions.           Mone Antonio is a 59 year old, presenting for the following:  Physical        12/3/2024     8:41 AM   Additional Questions   Roomed by CORA Almaguer LPN   Accompanied by self         12/3/2024     8:41 AM   Patient Reported Additional Medications   Patient reports taking the following new medications none          HPI      Health Care Directive  Patient does not have a Health Care Directive: Patient states has Advance Directive and will bring in a copy to clinic.      12/2/2024   General Health   How would you rate your overall physical health? Good   Feel stress (tense, anxious, or unable to sleep) Only a little      (!) STRESS CONCERN      12/2/2024   Nutrition   Three or more servings of calcium each day? Yes "   Diet: Regular (no restrictions)   How many servings of fruit and vegetables per day? (!) 0-1   How many sweetened beverages each day? 0-1            12/2/2024   Exercise   Days per week of moderate/strenous exercise 4 days   Average minutes spent exercising at this level 50 min            12/2/2024   Social Factors   Frequency of gathering with friends or relatives Twice a week   Worry food won't last until get money to buy more No   Food not last or not have enough money for food? No   Do you have housing? (Housing is defined as stable permanent housing and does not include staying ouside in a car, in a tent, in an abandoned building, in an overnight shelter, or couch-surfing.) Yes   Are you worried about losing your housing? No   Lack of transportation? No   Unable to get utilities (heat,electricity)? No            12/2/2024   Fall Risk   Fallen 2 or more times in the past year? No    Trouble with walking or balance? No        Patient-reported          12/2/2024   Dental   Dentist two times every year? Yes            12/2/2024   TB Screening   Were you born outside of the [place]? No              Today's PHQ-2 Score:       3/12/2024    10:44 AM   PHQ-2 ( 1999 [employer])   Q1: Little interest or pleasure in doing things 0   Q2: Feeling down, depressed or hopeless 0   PHQ-2 Score 0         12/2/2024   Substance Use   Alcohol more than 3/day or more than 7/wk No   Do you use any other substances recreationally? No        Social History     Tobacco Use    Smoking status: Never    Smokeless tobacco: Never   Vaping Use    Vaping status: Never Used   Substance Use Topics    Alcohol use: Yes     Alcohol/week: 3.0 standard drinks of alcohol     Types: 3 Standard drinks or equivalent per week     Comment: occ    Drug use: No           11/26/2023   LAST FHS-7 RESULTS   1st degree relative breast or ovarian cancer Yes    Any relative bilateral breast cancer No    Any male have breast cancer No    Any ONE woman have BOTH breast AND  ovarian cancer No    2 or more relatives with breast AND/OR ovarian cancer Yes    2 or more relatives with breast AND/OR bowel cancer Yes        Patient-reported        Mammogram Screening - Mammogram every 1-2 years updated in Health Maintenance based on mutual decision making        12/2/2024   STI Screening   New sexual partner(s) since last STI/HIV test? No        History of abnormal Pap smear: No - age 65 or older with adequate negative prior screening test results (3 consecutive negative cytology results, 2 consecutive negative cotesting results, or 2 consecutive negative HrHPV test results within 10 years, with the most recent test occurring within the recommended screening interval for the test used)        6/17/2020    12:00 AM 3/14/2014    12:00 AM   PAP / HPV   PAP (Historical)  normal       PAP-ABSTRACT See Scanned Document            This result is from an external source.     ASCVD Risk   The 10-year ASCVD risk score (Angela MORAN, et al., 2019) is: 3.2%    Values used to calculate the score:      Age: 59 years      Sex: Female      Is Non- : No      Diabetic: No      Tobacco smoker: No      Systolic Blood Pressure: 143 mmHg      Is BP treated: No      HDL Cholesterol: 80 mg/dL      Total Cholesterol: 236 mg/dL           Reviewed and updated as needed this visit by Provider                    Past Medical History:   Diagnosis Date    Benign neoplasm of cerebral meninges (H)     Embolism and thrombosis of unspecified site 9/07    complication from meningioma removal    Iatrogenic pulmonary embolism and infarction (H) 9/07    complication from meningioma removal    Paroxysmal supraventricular tachycardia (H)     Thyrotoxicosis without mention of goiter or other cause, without mention of thyrotoxic crisis or storm     txd .w propylthyouarcil twice currently in remission     Past Surgical History:   Procedure Laterality Date    ZZC EXCIS SUPRATENT MENINGIOMA  9/07    right  "frontal 8cm         Review of Systems  CONSTITUTIONAL: NEGATIVE for fever, chills, change in weight  INTEGUMENTARY/SKIN: NEGATIVE for worrisome rashes, moles or lesions  EYES: NEGATIVE for vision changes or irritation  ENT/MOUTH: NEGATIVE for ear, mouth and throat problems  RESP: NEGATIVE for significant cough or SOB  BREAST: NEGATIVE for masses, tenderness or discharge  CV: NEGATIVE for chest pain, palpitations or peripheral edema  GI: NEGATIVE for nausea, abdominal pain, heartburn, or change in bowel habits  : NEGATIVE for frequency, dysuria, or hematuria  MUSCULOSKELETAL: NEGATIVE for significant arthralgias or myalgia  NEURO: NEGATIVE for weakness, dizziness or paresthesias  ENDOCRINE: NEGATIVE for temperature intolerance, skin/hair changes  HEME: NEGATIVE for bleeding problems  PSYCHIATRIC: NEGATIVE for changes in mood or affect     Objective    Exam  BP (!) 143/96   Pulse 74   Temp 97.2  F (36.2  C) (Oral)   Resp 16   Ht 1.702 m (5' 7\")   Wt 80.7 kg (178 lb)   SpO2 99%   Breastfeeding No   BMI 27.88 kg/m     Estimated body mass index is 27.88 kg/m  as calculated from the following:    Height as of this encounter: 1.702 m (5' 7\").    Weight as of this encounter: 80.7 kg (178 lb).    Physical Exam  GENERAL: alert and no distress  EYES: Eyes grossly normal to inspection, PERRL and conjunctivae and sclerae normal  HENT: ear canals and TM's normal, nose and mouth without ulcers or lesions  NECK: no adenopathy, no asymmetry, masses, or scars  RESP: lungs clear to auscultation - no rales, rhonchi or wheezes  CV: regular rate and rhythm, normal S1 S2, no S3 or S4, no murmur, click or rub, no peripheral edema  ABDOMEN: soft, nontender, no hepatosplenomegaly, no masses and bowel sounds normal  MS: no gross musculoskeletal defects noted, no edema  SKIN: no suspicious lesions or rashes  NEURO: Normal strength and tone, mentation intact and speech normal  PSYCH: mentation appears normal, affect " normal/bright        Signed Electronically by: Madison Cabral MD

## 2024-12-04 LAB — TSH SERPL DL<=0.005 MIU/L-ACNC: 0.96 UIU/ML (ref 0.3–4.2)

## 2024-12-05 ENCOUNTER — TRANSFERRED RECORDS (OUTPATIENT)
Dept: HEALTH INFORMATION MANAGEMENT | Facility: CLINIC | Age: 59
End: 2024-12-05
Payer: COMMERCIAL

## 2025-02-12 DIAGNOSIS — G40.309 GENERALIZED CONVULSIVE EPILEPSY (H): Primary | ICD-10-CM

## 2025-02-18 RX ORDER — CARBAMAZEPINE 100 MG/1
CAPSULE, EXTENDED RELEASE ORAL
Qty: 460 CAPSULE | Refills: 0 | Status: SHIPPED | OUTPATIENT
Start: 2025-02-18

## 2025-02-25 ENCOUNTER — PATIENT OUTREACH (OUTPATIENT)
Dept: CARE COORDINATION | Facility: CLINIC | Age: 60
End: 2025-02-25
Payer: COMMERCIAL

## 2025-03-19 DIAGNOSIS — G40.309 GENERALIZED CONVULSIVE EPILEPSY (H): ICD-10-CM

## 2025-03-19 RX ORDER — CARBAMAZEPINE 100 MG/1
CAPSULE, EXTENDED RELEASE ORAL
Qty: 450 CAPSULE | Refills: 0 | Status: SHIPPED | OUTPATIENT
Start: 2025-03-19

## 2025-03-19 NOTE — TELEPHONE ENCOUNTER
Last seen: March 2024  RTC: 1 year  Cancel: no  No-show: no  Next appt: April 2025    Incoming refill from patient via phones    Medication requested: carBAMazepine (CARBATROL) 100 MG 12 hr capsule   Directions: Take 2 caps in AM and 3 caps in PM   Qty: 450  Last refilled: Feb 2025 - pt request fill sent to [facility] because regular pharmacy is out of stock    Medication refill approved per refill protocol

## 2025-03-19 NOTE — TELEPHONE ENCOUNTER
Which pharmacy does pt go to?  [id number] - [place], [place] - [address] AT [facility] [address]    Which medication is pt calling about?  carBAMazepine (CARBATROL) 100 MG 12 hr capsule    How much medication does pt have left?  >5 days left    Does pt have refills?  1 refill from [facility] but they are out of the medication.     Does pt need the refill within 24 hours?  NO    Is medication a controlled substance?  NO    Is pt scheduled for provider follow-up visit?  YES, Appt Date: 4/15/25      Good call back number: [phone]

## 2025-03-21 PROBLEM — D12.6 ADENOMATOUS POLYP OF COLON: Status: ACTIVE | Noted: 2025-03-21

## 2025-03-24 ENCOUNTER — PATIENT OUTREACH (OUTPATIENT)
Dept: GASTROENTEROLOGY | Facility: CLINIC | Age: 60
End: 2025-03-24
Payer: COMMERCIAL

## 2025-04-15 ENCOUNTER — OFFICE VISIT (OUTPATIENT)
Dept: NEUROLOGY | Facility: CLINIC | Age: 60
End: 2025-04-15
Payer: COMMERCIAL

## 2025-04-15 VITALS
SYSTOLIC BLOOD PRESSURE: 149 MMHG | DIASTOLIC BLOOD PRESSURE: 95 MMHG | HEART RATE: 73 BPM | WEIGHT: 174 LBS | BODY MASS INDEX: 27.31 KG/M2 | TEMPERATURE: 97.7 F | HEIGHT: 67 IN

## 2025-04-15 DIAGNOSIS — G40.309 GENERALIZED CONVULSIVE EPILEPSY (H): ICD-10-CM

## 2025-04-15 LAB
CARBAMAZEPINE SERPL-MCNC: 8.7 UG/ML (ref 4–12)
SODIUM SERPL-SCNC: 139 MMOL/L (ref 135–145)

## 2025-04-15 PROCEDURE — 84295 ASSAY OF SERUM SODIUM: CPT | Mod: ORL | Performed by: PSYCHIATRY & NEUROLOGY

## 2025-04-15 PROCEDURE — 80156 ASSAY CARBAMAZEPINE TOTAL: CPT | Mod: ORL | Performed by: PSYCHIATRY & NEUROLOGY

## 2025-04-15 RX ORDER — CARBAMAZEPINE 100 MG/1
CAPSULE, EXTENDED RELEASE ORAL
Qty: 450 CAPSULE | Refills: 3 | Status: SHIPPED | OUTPATIENT
Start: 2025-04-15

## 2025-04-15 NOTE — PROGRESS NOTES
[facility] Epilepsy Care Progress Note      Patient:  Marisela Beaver  :  1965   Age:  59 year old   Today's Office Visit:  4/15/2025    Epilepsy Data:    Seizure Record  Current Visit Date: 04/15/25  Previous Visit Date: 24  Months since last visit: 13.11  Seizure Type 1: Tonic-clonic seizures  Description of Sz Type 1: convulsions  # of Type 1 Seizure since last visit: 0  Freq. Type 1 / Month: 0    Background History:  Right frontal meningioma resection . Two probable BTC seizures , approx one year following resection. Treated with carbamazepine 700 mg/d with therapeutic levels since. Serial MRI's last  without recurrence. EEGs with intermittent right frontal slowing, possible right frontal IEDs in one study. EEG 2020 normal. MRI Dec 2021 right frontal encephalomalacia, no recurrence of meningioma.    History of Present Illness:     No seizures. Last seizuire .  Last visit we reduced carbamazepine from 600 mg/d to 500 mg/d.    Current Outpatient Medications   Medication Sig Dispense Refill    carBAMazepine (CARBATROL) 100 MG 12 hr capsule Take 2 caps in AM and 3 caps in  capsule 0    IBUPROFEN PO Take  by mouth as needed.      Multiple Vitamin (DAILY-VITAMIN) TABS Take 1 tablet by mouth daily.          Perceived AED Side Effects:  Yes    Medication Notes:  no side effects      AED Medication Compliance:  compliant all of the time  Using a pill box:  Yes    Review of Systems:  No vomiting, diarrhea, fevers, hematuria or kidney stones.  Have you experienced a traumatic fall since your last visit: NO  Are these falls related to your seizures:  Not Applicable    Other Issues:    No other health troubles. Blood pressure goes up intermittently. No BMD check since last visit. No fractures.  BP at home runs as high 150/110 at home during periods of stress. 135/85 when calmer. Primary care is aware and has chosen not to initiate treatment.  Is patient safe to drive:   "Yes    Woman Care:   Patient is:  postmenopausal.    Exam:    BP (!) 149/95   Pulse 73   Temp 97.7  F (36.5  C) (Temporal)   Ht 5' 7\" (170.2 cm)   Wt 174 lb (78.9 kg)   BMI 27.25 kg/m       Wt Readings from Last 5 Encounters:   04/15/25 174 lb (78.9 kg)   12/03/24 178 lb (80.7 kg)   03/12/24 176 lb 12.8 oz (80.2 kg)   11/27/23 176 lb (79.8 kg)   01/31/23 177 lb (80.3 kg)     Normal gait including tandem. One foot stand normal. Visual fields full. Extraocular movements intact without nystagmus. Smile symmetrical. Facial sensation equal. Tongue midline and strong. No drift, pronation, or tremor. Reflexes symmetrical including ankle jerks. Finger finger nose is done well. Mental status grossly intact without evidence of overt depression or anxiety. Strength full proximally and distally in legs. No grasp reflexes.     Latest Reference Range & Units 01/31/23 08:45 03/12/24 11:14 03/29/24 10:44   Carbamazepine 4.0 - 12.0 ug/mL 9.0 9.4 6.9      Latest Reference Range & Units 01/31/23 08:45 11/27/23 07:50 03/29/24 10:44   Sodium 135 - 145 mmol/L 138 140 140       Last two carbamazepine levels on 600 mg per day.     IMPRESSION  1) Status post resection right frontal meningioma 2007. No recurrence. Some right frontal encephalomalacia. Last scan Dec 2021, penultimate scan 2017.  2) Two seizures about one year after resection. No subsequent seizures. Not clear she requires continued AED treatment. Lamberink model suggests 20-30% risk but this may be an underestimate.  No seizures for last year with carbamazepine 500 mg per day. Last DEXA about two years ago shows good results with BMD essentially at density of young person. Probable decline over about 8 years tho very difficult to say given lack of actual measures, differing machines etc but overall not likely that decline any more than would be expected with age alone.     DISCUSSION  Above again discussed. We discussed transitioning to lamotrigine or lacosamide and what " this would entail. We discussed discontinuation. She again did not want to stop AEDs and did not want level significantly below therapeutic range so did not want reduction at this time. Nonetheless, we discussed risks of discontinuation, namely BTC which could result in significant trauma, pneumonia, fracture and there is even a small possibility of SUDEP, benefits would be decreased AED burden with potential decreased risk of future fractures or atherosclerosis. She did not want to increase risk of seizures at all and asked to remain on current dose.     PLAN:  1) continue  carbamazepine to 200 mg in AM and 300 mg in PM. Renewed Rx today.  2) carbamazepine level, sodium today.  3) Follow up one year in person. Call if any seizures.  4) Consider repeat MRI in one to two years; consider curbsiding NS on how frequently this should be done.     Total time in person today 24 min. Additional 5 min reviewing chart prior to visit. Additional 5 min generating note and coordinating care following visit. So total of 34 min, all on day of visit. The longitudinal plan of care for this patient's epilepsy (including epilepsy comorbidities if appropriate) was addressed during this visit.      Jaspreet Camarena MD

## 2025-04-15 NOTE — LETTER
4/15/2025       RE: Marisela Beaver  : 1965   MRN: [id number]      Dear Colleague,    Thank you for referring your patient, Marisela Beaver, to the [facility] EPILEPSY CARE at [facility]. Please see a copy of my visit note below.    [facility] Epilepsy Care Progress Note      Patient:  Marisela Beaver  :  1965   Age:  59 year old   Today's Office Visit:  4/15/2025    Epilepsy Data:    Seizure Record  Current Visit Date: 04/15/25  Previous Visit Date: 24  Months since last visit: 13.11  Seizure Type 1: Tonic-clonic seizures  Description of Sz Type 1: convulsions  # of Type 1 Seizure since last visit: 0  Freq. Type 1 / Month: 0    Background History:  Right frontal meningioma resection . Two probable BTC seizures , approx one year following resection. Treated with carbamazepine 700 mg/d with therapeutic levels since. Serial MRI's last  without recurrence. EEGs with intermittent right frontal slowing, possible right frontal IEDs in one study. EEG 2020 normal. MRI Dec 2021 right frontal encephalomalacia, no recurrence of meningioma.    History of Present Illness:     No seizures. Last seizuire .  Last visit we reduced carbamazepine from 600 mg/d to 500 mg/d.    Current Outpatient Medications   Medication Sig Dispense Refill     carBAMazepine (CARBATROL) 100 MG 12 hr capsule Take 2 caps in AM and 3 caps in  capsule 0     IBUPROFEN PO Take  by mouth as needed.       Multiple Vitamin (DAILY-VITAMIN) TABS Take 1 tablet by mouth daily.          Perceived AED Side Effects:  Yes    Medication Notes:  no side effects      AED Medication Compliance:  compliant all of the time  Using a pill box:  Yes    Review of Systems:  No vomiting, diarrhea, fevers, hematuria or kidney stones.  Have you experienced a traumatic fall since your last visit: NO  Are these falls related to your seizures:  Not  "Applicable    Other Issues:    No other health troubles. Blood pressure goes up intermittently. No BMD check since last visit. No fractures.  BP at home runs as high 150/110 at home during periods of stress. 135/85 when calmer. Primary care is aware and has chosen not to initiate treatment.  Is patient safe to drive:  Yes    Woman Care:   Patient is:  postmenopausal.    Exam:    BP (!) 149/95   Pulse 73   Temp 97.7  F (36.5  C) (Temporal)   Ht 5' 7\" (170.2 cm)   Wt 174 lb (78.9 kg)   BMI 27.25 kg/m       Wt Readings from Last 5 Encounters:   04/15/25 174 lb (78.9 kg)   12/03/24 178 lb (80.7 kg)   03/12/24 176 lb 12.8 oz (80.2 kg)   11/27/23 176 lb (79.8 kg)   01/31/23 177 lb (80.3 kg)     Normal gait including tandem. One foot stand normal. Visual fields full. Extraocular movements intact without nystagmus. Smile symmetrical. Facial sensation equal. Tongue midline and strong. No drift, pronation, or tremor. Reflexes symmetrical including ankle jerks. Finger finger nose is done well. Mental status grossly intact without evidence of overt depression or anxiety. Strength full proximally and distally in legs. No grasp reflexes.     Latest Reference Range & Units 01/31/23 08:45 03/12/24 11:14 03/29/24 10:44   Carbamazepine 4.0 - 12.0 ug/mL 9.0 9.4 6.9      Latest Reference Range & Units 01/31/23 08:45 11/27/23 07:50 03/29/24 10:44   Sodium 135 - 145 mmol/L 138 140 140       Last two carbamazepine levels on 600 mg per day.     IMPRESSION  1) Status post resection right frontal meningioma 2007. No recurrence. Some right frontal encephalomalacia. Last scan Dec 2021, penultimate scan 2017.  2) Two seizures about one year after resection. No subsequent seizures. Not clear she requires continued AED treatment. Lamberink model suggests 20-30% risk but this may be an underestimate.  No seizures for last year with carbamazepine 500 mg per day. Last DEXA about two years ago shows good results with BMD essentially at density " of young person. Probable decline over about 8 years tho very difficult to say given lack of actual measures, differing machines etc but overall not likely that decline any more than would be expected with age alone.     DISCUSSION  Above again discussed. We discussed transitioning to lamotrigine or lacosamide and what this would entail. We discussed discontinuation. She again did not want to stop AEDs and did not want level significantly below therapeutic range so did not want reduction at this time. Nonetheless, we discussed risks of discontinuation, namely BTC which could result in significant trauma, pneumonia, fracture and there is even a small possibility of SUDEP, benefits would be decreased AED burden with potential decreased risk of future fractures or atherosclerosis. She did not want to increase risk of seizures at all and asked to remain on current dose.     PLAN:  1) continue  carbamazepine to 200 mg in AM and 300 mg in PM. Renewed Rx today.  2) carbamazepine level, sodium today.  3) Follow up one year in person. Call if any seizures.  4) Consider repeat MRI in one to two years; consider curbsiding NS on how frequently this should be done.     Total time in person today 24 min. Additional 5 min reviewing chart prior to visit. Additional 5 min generating note and coordinating care following visit. So total of 34 min, all on day of visit. The longitudinal plan of care for this patient's epilepsy (including epilepsy comorbidities if appropriate) was addressed during this visit.      Jaspreet Camarena MD      Again, thank you for allowing me to participate in the care of your patient.      Sincerely,    Jaspreet Camarena MD